# Patient Record
Sex: MALE | Race: OTHER | URBAN - METROPOLITAN AREA
[De-identification: names, ages, dates, MRNs, and addresses within clinical notes are randomized per-mention and may not be internally consistent; named-entity substitution may affect disease eponyms.]

---

## 2017-10-17 ENCOUNTER — APPOINTMENT (RX ONLY)
Dept: URBAN - METROPOLITAN AREA CLINIC 23 | Facility: CLINIC | Age: 82
Setting detail: DERMATOLOGY
End: 2017-10-17

## 2017-10-17 ENCOUNTER — RX ONLY (OUTPATIENT)
Age: 82
Setting detail: RX ONLY
End: 2017-10-17

## 2017-10-17 DIAGNOSIS — L81.4 OTHER MELANIN HYPERPIGMENTATION: ICD-10-CM

## 2017-10-17 DIAGNOSIS — Z85.828 PERSONAL HISTORY OF OTHER MALIGNANT NEOPLASM OF SKIN: ICD-10-CM

## 2017-10-17 DIAGNOSIS — L21.8 OTHER SEBORRHEIC DERMATITIS: ICD-10-CM

## 2017-10-17 DIAGNOSIS — L82.1 OTHER SEBORRHEIC KERATOSIS: ICD-10-CM

## 2017-10-17 PROCEDURE — ? COUNSELING

## 2017-10-17 PROCEDURE — 99213 OFFICE O/P EST LOW 20 MIN: CPT

## 2017-10-17 PROCEDURE — ? PRESCRIPTION

## 2017-10-17 RX ORDER — KETOCONAZOLE 20 MG/G
CREAM TOPICAL
Qty: 1 | Refills: 11 | Status: ERX

## 2017-10-17 RX ORDER — DESONIDE 0.5 MG/G
CREAM TOPICAL
Qty: 1 | Refills: 6 | Status: ERX

## 2017-10-17 RX ORDER — HYDROCORTISONE 25 MG/G
CREAM TOPICAL
Qty: 1 | Refills: 6 | Status: ERX | COMMUNITY
Start: 2017-10-17

## 2017-10-17 ASSESSMENT — LOCATION DETAILED DESCRIPTION DERM
LOCATION DETAILED: NASAL DORSUM
LOCATION DETAILED: RIGHT POSTERIOR SHOULDER
LOCATION DETAILED: LEFT MEDIAL SUPERIOR CHEST
LOCATION DETAILED: POSTERIOR MID-PARIETAL SCALP
LOCATION DETAILED: RIGHT SUPERIOR MEDIAL LOWER BACK
LOCATION DETAILED: GLABELLA
LOCATION DETAILED: LEFT POSTERIOR SHOULDER
LOCATION DETAILED: LEFT CENTRAL POSTAURICULAR SKIN
LOCATION DETAILED: RIGHT MEDIAL UPPER BACK

## 2017-10-17 ASSESSMENT — LOCATION SIMPLE DESCRIPTION DERM
LOCATION SIMPLE: LEFT SHOULDER
LOCATION SIMPLE: RIGHT UPPER BACK
LOCATION SIMPLE: RIGHT SHOULDER
LOCATION SIMPLE: SCALP
LOCATION SIMPLE: CHEST
LOCATION SIMPLE: POSTERIOR SCALP
LOCATION SIMPLE: NOSE
LOCATION SIMPLE: GLABELLA
LOCATION SIMPLE: RIGHT LOWER BACK

## 2017-10-17 ASSESSMENT — LOCATION ZONE DERM
LOCATION ZONE: ARM
LOCATION ZONE: FACE
LOCATION ZONE: TRUNK
LOCATION ZONE: NOSE
LOCATION ZONE: TRUNK
LOCATION ZONE: SCALP

## 2018-04-17 ENCOUNTER — APPOINTMENT (RX ONLY)
Dept: URBAN - METROPOLITAN AREA CLINIC 23 | Facility: CLINIC | Age: 83
Setting detail: DERMATOLOGY
End: 2018-04-17

## 2018-04-17 DIAGNOSIS — L82.1 OTHER SEBORRHEIC KERATOSIS: ICD-10-CM

## 2018-04-17 DIAGNOSIS — Z85.828 PERSONAL HISTORY OF OTHER MALIGNANT NEOPLASM OF SKIN: ICD-10-CM

## 2018-04-17 PROBLEM — L57.0 ACTINIC KERATOSIS: Status: ACTIVE | Noted: 2018-04-17

## 2018-04-17 PROBLEM — L29.8 OTHER PRURITUS: Status: ACTIVE | Noted: 2018-04-17

## 2018-04-17 PROCEDURE — 99213 OFFICE O/P EST LOW 20 MIN: CPT

## 2018-04-17 PROCEDURE — ? COUNSELING

## 2018-04-17 ASSESSMENT — LOCATION SIMPLE DESCRIPTION DERM
LOCATION SIMPLE: POSTERIOR SCALP
LOCATION SIMPLE: LEFT SCALP
LOCATION SIMPLE: LEFT FOREHEAD

## 2018-04-17 ASSESSMENT — LOCATION DETAILED DESCRIPTION DERM
LOCATION DETAILED: LEFT MEDIAL FRONTAL SCALP
LOCATION DETAILED: POSTERIOR MID-PARIETAL SCALP
LOCATION DETAILED: LEFT FOREHEAD

## 2018-04-17 ASSESSMENT — LOCATION ZONE DERM
LOCATION ZONE: SCALP
LOCATION ZONE: FACE

## 2018-12-13 ENCOUNTER — APPOINTMENT (RX ONLY)
Dept: URBAN - METROPOLITAN AREA CLINIC 23 | Facility: CLINIC | Age: 83
Setting detail: DERMATOLOGY
End: 2018-12-13

## 2018-12-13 ENCOUNTER — RX ONLY (OUTPATIENT)
Age: 83
Setting detail: RX ONLY
End: 2018-12-13

## 2018-12-13 DIAGNOSIS — L82.1 OTHER SEBORRHEIC KERATOSIS: ICD-10-CM

## 2018-12-13 DIAGNOSIS — L85.3 XEROSIS CUTIS: ICD-10-CM

## 2018-12-13 DIAGNOSIS — D485 NEOPLASM OF UNCERTAIN BEHAVIOR OF SKIN: ICD-10-CM

## 2018-12-13 DIAGNOSIS — L30.9 DERMATITIS, UNSPECIFIED: ICD-10-CM

## 2018-12-13 DIAGNOSIS — Z85.828 PERSONAL HISTORY OF OTHER MALIGNANT NEOPLASM OF SKIN: ICD-10-CM

## 2018-12-13 DIAGNOSIS — L57.0 ACTINIC KERATOSIS: ICD-10-CM

## 2018-12-13 PROBLEM — D48.5 NEOPLASM OF UNCERTAIN BEHAVIOR OF SKIN: Status: ACTIVE | Noted: 2018-12-13

## 2018-12-13 PROBLEM — L29.8 OTHER PRURITUS: Status: ACTIVE | Noted: 2018-12-13

## 2018-12-13 PROCEDURE — 99213 OFFICE O/P EST LOW 20 MIN: CPT | Mod: 25

## 2018-12-13 PROCEDURE — ? BIOPSY BY SHAVE METHOD

## 2018-12-13 PROCEDURE — ? LIQUID NITROGEN (COSMETIC)

## 2018-12-13 PROCEDURE — ? COUNSELING

## 2018-12-13 PROCEDURE — ? PRESCRIPTION

## 2018-12-13 PROCEDURE — 11100: CPT

## 2018-12-13 PROCEDURE — ? TREATMENT REGIMEN

## 2018-12-13 RX ORDER — FLUOROURACIL 2 G/40G
CREAM TOPICAL
Qty: 1 | Refills: 2 | Status: ERX

## 2018-12-13 RX ORDER — FLUOROURACIL 2 G/40G
CREAM TOPICAL
Qty: 1 | Refills: 2 | Status: ERX | COMMUNITY
Start: 2018-12-13

## 2018-12-13 RX ADMIN — FLUOROURACIL: 2 CREAM TOPICAL at 14:21

## 2018-12-13 ASSESSMENT — LOCATION DETAILED DESCRIPTION DERM
LOCATION DETAILED: RIGHT BUTTOCK
LOCATION DETAILED: NASAL SUPRATIP
LOCATION DETAILED: RIGHT ANTERIOR PROXIMAL THIGH
LOCATION DETAILED: LEFT SUPERIOR MEDIAL FOREHEAD
LOCATION DETAILED: POSTERIOR MID-PARIETAL SCALP
LOCATION DETAILED: LEFT FOREHEAD
LOCATION DETAILED: MID-FRONTAL SCALP

## 2018-12-13 ASSESSMENT — LOCATION SIMPLE DESCRIPTION DERM
LOCATION SIMPLE: POSTERIOR SCALP
LOCATION SIMPLE: RIGHT BUTTOCK
LOCATION SIMPLE: ANTERIOR SCALP
LOCATION SIMPLE: NOSE
LOCATION SIMPLE: RIGHT THIGH
LOCATION SIMPLE: LEFT FOREHEAD

## 2018-12-13 ASSESSMENT — LOCATION ZONE DERM
LOCATION ZONE: LEG
LOCATION ZONE: SCALP
LOCATION ZONE: NOSE
LOCATION ZONE: FACE
LOCATION ZONE: TRUNK

## 2018-12-13 NOTE — PROCEDURE: BIOPSY BY SHAVE METHOD
Anesthesia Type: 1% lidocaine with epinephrine
Type Of Destruction Used: Curettage
Additional Anesthesia Volume In Cc (Will Not Render If 0): 0
Biopsy Method: Dermablade
Depth Of Biopsy: dermis
Dressing: bandage
Electrodesiccation And Curettage Text: The wound bed was treated with electrodesiccation and curettage after the biopsy was performed.
Bill For Surgical Tray: no
Electrodesiccation Text: The wound bed was treated with electrodesiccation after the biopsy was performed.
Anesthesia Volume In Cc: 0.3
Hemostasis: Electrocautery
Wound Care: Petrolatum
Consent: Written consent was obtained and risks were reviewed including but not limited to scarring, infection, bleeding, scabbing, incomplete removal, nerve damage and allergy to anesthesia.
Silver Nitrate Text: The wound bed was treated with silver nitrate after the biopsy was performed.
Size Of Lesion In Cm: 0.4
Billing Type: Third-Party Bill
Notification Instructions: Patient will be notified of biopsy results. However, patient instructed to call the office if not contacted within 2 weeks.
Biopsy Type: H and E
Post-Care Instructions: I reviewed with the patient in detail post-care instructions. Patient is to keep the biopsy site dry overnight, and then apply bacitracin twice daily until healed. Patient may apply hydrogen peroxide soaks to remove any crusting.
Was A Bandage Applied: Yes
Path Notes (To The Dermatopathologist): Please check margins.
Detail Level: Detailed
Cryotherapy Text: The wound bed was treated with cryotherapy after the biopsy was performed.

## 2019-02-18 ENCOUNTER — APPOINTMENT (RX ONLY)
Dept: URBAN - METROPOLITAN AREA CLINIC 24 | Facility: CLINIC | Age: 84
Setting detail: DERMATOLOGY
End: 2019-02-18

## 2019-02-18 ENCOUNTER — RX ONLY (OUTPATIENT)
Age: 84
Setting detail: RX ONLY
End: 2019-02-18

## 2019-02-18 PROBLEM — C44.311 BASAL CELL CARCINOMA OF SKIN OF NOSE: Status: ACTIVE | Noted: 2019-02-18

## 2019-02-18 PROCEDURE — ? MOHS SURGERY

## 2019-02-18 PROCEDURE — 17311 MOHS 1 STAGE H/N/HF/G: CPT

## 2019-02-18 PROCEDURE — 17312 MOHS ADDL STAGE: CPT

## 2019-02-18 RX ORDER — HYDROCORTISONE 25 MG/G
CREAM TOPICAL
Qty: 1 | Refills: 6 | Status: ERX

## 2019-02-18 NOTE — PROCEDURE: MOHS SURGERY
Mohs Case Number: 
Show Eye Protection Variable: Yes
Consent (Near Eyelid Margin)/Introductory Paragraph: The rationale for Mohs was explained to the patient and consent was obtained. The risks, benefits and alternatives to therapy were discussed in detail. Specifically, the risks of ectropion or eyelid deformity, infection, scarring, bleeding, prolonged wound healing, incomplete removal, allergy to anesthesia, nerve injury and recurrence were addressed. Prior to the procedure, the treatment site was clearly identified and confirmed by the patient. All components of Universal Protocol/PAUSE Rule completed.
Tumor Debulked?: curette
Stage 2: Additional Anesthesia Volume In Cc: 0
Z Plasty Text: The lesion was extirpated to the level of the fat with a #15 scalpel blade.  Given the location of the defect, shape of the defect and the proximity to free margins a Z-plasty was deemed most appropriate for repair.  Using a sterile surgical marker, the appropriate transposition arms of the Z-plasty were drawn incorporating the defect and placing the expected incisions within the relaxed skin tension lines where possible.    The area thus outlined was incised deep to adipose tissue with a #15 scalpel blade.  The skin margins were undermined to an appropriate distance in all directions utilizing iris scissors.  The opposing transposition arms were then transposed into place in opposite direction and anchored with interrupted buried subcutaneous sutures.
Surgeon/Pathologist Verbiage (Will Incorporate Name Of Surgeon From Intro If Not Blank): operated in two distinct and integrated capacities as the surgeon and pathologist.
Quadrant Reporting?: no
Closure 2 Information: This tab is for additional flaps and grafts, including complex repair and grafts and complex repair and flaps. You can also specify a different location for the additional defect, if the location is the same you do not need to select a new one. We will insert the automated text for the repair you select below just as we do for solitary flaps and grafts. Please note that at this time if you select a location with a different insurance zone you will need to override the ICD10 and CPT if appropriate.
Referred To Oculoplastics For Closure Text (Leave Blank If You Do Not Want): After obtaining clear surgical margins the patient was sent to oculoplastics for surgical repair.  The patient understands they will receive post-surgical care and follow-up from the referring physician's office.
Mid-Level Procedure Text (C): After obtaining clear surgical margins the patient was sent to a mid-level provider for surgical repair.  The patient understands they will receive post-surgical care and follow-up from the mid-level provider.
Purse String (Intermediate) Text: Given the location of the defect and the characteristics of the surrounding skin a pursestring intermediate closure was deemed most appropriate.  Undermining was performed circumfirentially around the surgical defect.  A purstring suture was then placed and tightened.
Complex Repair And Graft Additional Text (Will Appearing After The Standard Complex Repair Text): The complex repair was not sufficient to completely close the primary defect. The remaining additional defect was repaired with the graft mentioned below.
Eye Protection Verbiage: Before proceeding with the stage, a plastic scleral shield was inserted. The globe was anesthetized with a few drops of 1% lidocaine with 1:100,000 epinephrine. Then, an appropriate sized scleral shield was chosen and coated with lacrilube ointment. The shield was gently inserted and left in place for the duration of each stage. After the stage was completed, the shield was gently removed.
Stage 4: Additional Anesthesia Type: 1% lidocaine with epinephrine
Bcc Histology Text: There were numerous aggregates of basaloid cells.
Estimated Blood Loss (Cc): minimal
Bcc Infiltrative Histology Text: There were numerous aggregates of basaloid cells demonstrating an infiltrative pattern.
Intermediate Repair Preamble Text (Leave Blank If You Do Not Want): Undermining was performed with blunt dissection.
Provider Procedure Text (E): After obtaining clear surgical margins the defect was repaired by another provider.
Hemostasis: Electrocautery
Plastic Surgeon Procedure Text (B): After obtaining clear surgical margins the patient was sent to plastics for surgical repair.  The patient understands they will receive post-surgical care and follow-up from the referring physician's office.
Mohs Rapid Report Verbiage: The area of clinically evident tumor was marked with skin marking ink and appropriately hatched.  The initial incision was made following the Mohs approach through the skin.  The specimen was taken to the lab, divided into the necessary number of pieces, chromacoded and processed according to the Mohs protocol.  This was repeated in successive stages until a tumor free defect was achieved.
Otolaryngologist Procedure Text (E): After obtaining clear surgical margins the patient was sent to otolaryngology for surgical repair.  The patient understands they will receive post-surgical care and follow-up from the referring physician's office.
Double M-Plasty Complex Repair Preamble Text (Leave Blank If You Do Not Want): Extensive wide undermining was performed.
Referred To Asc For Closure Text (Leave Blank If You Do Not Want): After obtaining clear surgical margins the patient was sent to an ASC for surgical repair.  The patient understands they will receive post-surgical care and follow-up from the ASC physician.
Modified Advancement Flap Text: The defect edges were debeveled with a #15 scalpel blade.  Given the location of the defect, shape of the defect and the proximity to free margins a modified advancement flap was deemed most appropriate.  Using a sterile surgical marker, an appropriate advancement flap was drawn incorporating the defect and placing the expected incisions within the relaxed skin tension lines where possible.    The area thus outlined was incised deep to adipose tissue with a #15 scalpel blade.  The skin margins were undermined to an appropriate distance in all directions utilizing iris scissors.
Composite Graft Text: The defect edges were debeveled with a #15 scalpel blade.  Given the location of the defect, shape of the defect, the proximity to free margins and the fact the defect was full thickness a composite graft was deemed most appropriate.  The defect was outline and then transferred to the donor site.  A full thickness graft was then excised from the donor site. The graft was then placed in the primary defect, oriented appropriately and then sutured into place.  The secondary defect was then repaired using a primary closure.
Dressing: dry sterile dressing
Secondary Intention Text (Leave Blank If You Do Not Want): The defect will heal with secondary intention.
Area L Indication Text: Tumors in this location are included in Area L (trunk and extremities).  Mohs surgery is indicated for larger tumors, 2 cm or larger, in these anatomic locations.
Home Suture Removal Text: Patient was provided instructions on removing sutures and will remove their sutures at home.  If they have any questions or difficulties they will call the office.
Medical Necessity Statement: Based on my medical judgement, Mohs surgery is the most appropriate treatment for this cancer compared to other treatments.
Alternatives Discussed Intro (Do Not Add Period): I discussed alternative treatments to Mohs surgery and specifically discussed the risks and benefits of
Epidermal Closure: running and interrupted
Ftsg Text: The defect edges were debeveled with a #15 scalpel blade.  Given the location of the defect, shape of the defect and the proximity to free margins a full thickness skin graft was deemed most appropriate.  Using a sterile surgical marker, the primary defect shape was transferred to the donor site. The area thus outlined was incised deep to adipose tissue with a #15 scalpel blade.  The harvested graft was then trimmed of adipose tissue until only dermis and epidermis was left.  The skin margins of the secondary defect were undermined to an appropriate distance in all directions utilizing iris scissors.  The secondary defect was closed with interrupted buried subcutaneous sutures.  The skin edges were then re-apposed with running  sutures.  The skin graft was then placed in the primary defect and oriented appropriately.
Transposition Flap Text: The defect edges were debeveled with a #15 scalpel blade.  Given the location of the defect and the proximity to free margins a transposition flap was deemed most appropriate.  Using a sterile surgical marker, an appropriate transposition flap was drawn incorporating the defect.    The area thus outlined was incised deep to adipose tissue with a #15 scalpel blade.  The skin margins were undermined to an appropriate distance in all directions utilizing iris scissors.
Helical Rim Advancement Flap Text: The defect edges were debeveled with a #15 blade scalpel.  Given the location of the defect and the proximity to free margins (helical rim) a double helical rim advancement flap was deemed most appropriate.  Using a sterile surgical marker, the appropriate advancement flaps were drawn incorporating the defect and placing the expected incisions between the helical rim and antihelix where possible.  The area thus outlined was incised through and through with a #15 scalpel blade.  With a skin hook and iris scissors, the flaps were gently and sharply undermined and freed up.
Consent 3/Introductory Paragraph: I gave the patient a chance to ask questions they had about the procedure.  Following this I explained the Mohs procedure and consent was obtained. The risks, benefits and alternatives to therapy were discussed in detail. Specifically, the risks of infection, scarring, bleeding, prolonged wound healing, incomplete removal, allergy to anesthesia, nerve injury and recurrence were addressed. Prior to the procedure, the treatment site was clearly identified and confirmed by the patient. All components of Universal Protocol/PAUSE Rule completed.
Anesthesia Volume In Cc: 4
Area H Indication Text: Tumors in this location are included in Area H (eyelids, eyebrows, nose, lips, chin, ear, pre-auricular, post-auricular, temple, genitalia, hands, feet, ankles and areola).  Tissue conservation is critical in these anatomic locations.
V-Y Plasty Text: The defect edges were debeveled with a #15 scalpel blade.  Given the location of the defect, shape of the defect and the proximity to free margins an V-Y advancement flap was deemed most appropriate.  Using a sterile surgical marker, an appropriate advancement flap was drawn incorporating the defect and placing the expected incisions within the relaxed skin tension lines where possible.    The area thus outlined was incised deep to adipose tissue with a #15 scalpel blade.  The skin margins were undermined to an appropriate distance in all directions utilizing iris scissors.
Inflammation Suggestive Of Cancer Camouflage Histology Text: There was a dense lymphocytic infiltrate which prevented adequate histologic evaluation of adjacent structures.
Consent (Temporal Branch)/Introductory Paragraph: The rationale for Mohs was explained to the patient and consent was obtained. The risks, benefits and alternatives to therapy were discussed in detail. Specifically, the risks of damage to the temporal branch of the facial nerve, infection, scarring, bleeding, prolonged wound healing, incomplete removal, allergy to anesthesia, and recurrence were addressed. Prior to the procedure, the treatment site was clearly identified and confirmed by the patient. All components of Universal Protocol/PAUSE Rule completed.
Partial Purse String (Intermediate) Text: Given the location of the defect and the characteristics of the surrounding skin an intermediate purse string closure was deemed most appropriate.  Undermining was performed circumfirentially around the surgical defect.  A purse string suture was then placed and tightened. Wound tension only allowed a partial closure of the circular defect.
Consent (Lip)/Introductory Paragraph: The rationale for Mohs was explained to the patient and consent was obtained. The risks, benefits and alternatives to therapy were discussed in detail. Specifically, the risks of lip deformity, changes in the oral aperture, infection, scarring, bleeding, prolonged wound healing, incomplete removal, allergy to anesthesia, nerve injury and recurrence were addressed. Prior to the procedure, the treatment site was clearly identified and confirmed by the patient. All components of Universal Protocol/PAUSE Rule completed.
Area M Indication Text: Tumors in this location are included in Area M (cheek, forehead, scalp, neck, jawline and pretibial skin).  Mohs surgery is indicated for tumors 1 cm or larger in these anatomic locations.
Post-Care Instructions: I reviewed with the patient in detail post-care instructions. Patient is not to engage in any heavy lifting, exercise, or swimming for the next 14 days. Should the patient develop any fevers, chills, bleeding, severe pain patient will contact the office immediately.
Localized Dermabrasion With Wire Brush Text: The patient was draped in routine manner.  Localized dermabrasion using 3 x 17 mm wire brush was performed in routine manner to papillary dermis. This spot dermabrasion is being performed to complete skin cancer reconstruction. It also will eliminate the other sun damaged precancerous cells that are known to be part of the regional effect of a lifetime's worth of sun exposure. This localized dermabrasion is therapeutic and should not be considered cosmetic in any regard.
Information: Selecting Yes will display possible errors in your note based on the variables you have selected. This validation is only offered as a suggestion for you. PLEASE NOTE THAT THE VALIDATION TEXT WILL BE REMOVED WHEN YOU FINALIZE YOUR NOTE. IF YOU WANT TO FAX A PRELIMINARY NOTE YOU WILL NEED TO TOGGLE THIS TO 'NO' IF YOU DO NOT WANT IT IN YOUR FAXED NOTE.
Unna Boot Text: An Unna boot was placed to help immobilize the limb and facilitate more rapid healing.
Cartilage Graft Text: The defect edges were debeveled with a #15 scalpel blade.  Given the location of the defect, shape of the defect, the fact the defect involved a full thickness cartilage defect a cartilage graft was deemed most appropriate.  An appropriate donor site was identified, cleansed, and anesthetized. The cartilage graft was then harvested and transferred to the recipient site, oriented appropriately and then sutured into place.  The secondary defect was then repaired using a primary closure.
Island Pedicle Flap-Requiring Vessel Identification Text: The defect edges were debeveled with a #15 scalpel blade.  Given the location of the defect, shape of the defect and the proximity to free margins an island pedicle advancement flap was deemed most appropriate.  Using a sterile surgical marker, an appropriate advancement flap was drawn, based on the axial vessel mentioned above, incorporating the defect, outlining the appropriate donor tissue and placing the expected incisions within the relaxed skin tension lines where possible.    The area thus outlined was incised deep to adipose tissue with a #15 scalpel blade.  The skin margins were undermined to an appropriate distance in all directions around the primary defect and laterally outward around the island pedicle utilizing iris scissors.  There was minimal undermining beneath the pedicle flap.
Closure 4 Information: This tab is for additional flaps and grafts above and beyond our usual structured repairs.  Please note if you enter information here it will not currently bill and you will need to add the billing information manually.
Purse String (Simple) Text: Given the location of the defect and the characteristics of the surrounding skin a pursestring closure was deemed most appropriate.  Undermining was performed circumfirentially around the surgical defect.  A purstring suture was then placed and tightened.
Advancement Flap (Single) Text: The defect edges were debeveled with a #15 scalpel blade.  Given the location of the defect and the proximity to free margins a single advancement flap was deemed most appropriate.  Using a sterile surgical marker, an appropriate advancement flap was drawn incorporating the defect and placing the expected incisions within the relaxed skin tension lines where possible.    The area thus outlined was incised deep to adipose tissue with a #15 scalpel blade.  The skin margins were undermined to an appropriate distance in all directions utilizing iris scissors.
Cheek-To-Nose Interpolation Flap Text: A decision was made to reconstruct the defect utilizing an interpolation axial flap and a staged reconstruction.  A telfa template was made of the defect.  This telfa template was then used to outline the Cheek-To-Nose Interpolation flap.  The donor area for the pedicle flap was then injected with anesthesia.  The flap was excised through the skin and subcutaneous tissue down to the layer of the underlying musculature.  The interpolation flap was carefully excised within this deep plane to maintain its blood supply.  The edges of the donor site were undermined.   The donor site was closed in a primary fashion.  The pedicle was then rotated into position and sutured.  Once the tube was sutured into place, adequate blood supply was confirmed with blanching and refill.  The pedicle was then wrapped with xeroform gauze and dressed appropriately with a telfa and gauze bandage to ensure continued blood supply and protect the attached pedicle.
Split-Thickness Skin Graft Text: The defect edges were debeveled with a #15 scalpel blade.  Given the location of the defect, shape of the defect and the proximity to free margins a split thickness skin graft was deemed most appropriate.  Using a sterile surgical marker, the primary defect shape was transferred to the donor site. The split thickness graft was then harvested.  The skin graft was then placed in the primary defect and oriented appropriately.
W Plasty Text: The lesion was extirpated to the level of the fat with a #15 scalpel blade.  Given the location of the defect, shape of the defect and the proximity to free margins a W-plasty was deemed most appropriate for repair.  Using a sterile surgical marker, the appropriate transposition arms of the W-plasty were drawn incorporating the defect and placing the expected incisions within the relaxed skin tension lines where possible.    The area thus outlined was incised deep to adipose tissue with a #15 scalpel blade.  The skin margins were undermined to an appropriate distance in all directions utilizing iris scissors.  The opposing transposition arms were then transposed into place in opposite direction and anchored with interrupted buried subcutaneous sutures.
A-T Advancement Flap Text: The defect edges were debeveled with a #15 scalpel blade.  Given the location of the defect, shape of the defect and the proximity to free margins an A-T advancement flap was deemed most appropriate.  Using a sterile surgical marker, an appropriate advancement flap was drawn incorporating the defect and placing the expected incisions within the relaxed skin tension lines where possible.    The area thus outlined was incised deep to adipose tissue with a #15 scalpel blade.  The skin margins were undermined to an appropriate distance in all directions utilizing iris scissors.
Partial Purse String (Simple) Text: Given the location of the defect and the characteristics of the surrounding skin a simple purse string closure was deemed most appropriate.  Undermining was performed circumfirentially around the surgical defect.  A purse string suture was then placed and tightened. Wound tension only allowed a partial closure of the circular defect.
Subsequent Stages Histo Method Verbiage: Using a similar technique to that described above, a thin layer of tissue was removed from all areas where tumor was visible on the previous stage.  The tissue was again oriented, mapped, dyed, and processed as above.
Rhombic Flap Text: The defect edges were debeveled with a #15 scalpel blade.  Given the location of the defect and the proximity to free margins a rhombic flap was deemed most appropriate.  Using a sterile surgical marker, an appropriate for rhombic flap was drawn incorporating the defect.    The area thus outlined was incised deep to adipose tissue with a #15 scalpel blade.  The skin margins were undermined to an appropriate distance in all directions utilizing iris scissors.
Banner Transposition Flap Text: The defect edges were debeveled with a #15 scalpel blade.  Given the location of the defect and the proximity to free margins a Banner transposition flap was deemed most appropriate.  Using a sterile surgical marker, an appropriate flap drawn around the defect. The area thus outlined was incised deep to adipose tissue with a #15 scalpel blade.  The skin margins were undermined to an appropriate distance in all directions utilizing iris scissors.
Crescentic Advancement Flap Text: The defect edges were debeveled with a #15 scalpel blade.  Given the location of the defect and the proximity to free margins a crescentic advancement flap was deemed most appropriate.  Using a sterile surgical marker, the appropriate advancement flap was drawn incorporating the defect and placing the expected incisions within the relaxed skin tension lines where possible.    The area thus outlined was incised deep to adipose tissue with a #15 scalpel blade.  The skin margins were undermined to an appropriate distance in all directions utilizing iris scissors.
Island Pedicle Flap Text: The defect edges were debeveled with a #15 scalpel blade.  Given the location of the defect, shape of the defect and the proximity to free margins an island pedicle advancement flap was deemed most appropriate.  Using a sterile surgical marker, an appropriate advancement flap was drawn incorporating the defect, outlining the appropriate donor tissue and placing the expected incisions within the relaxed skin tension lines where possible.    The area thus outlined was incised deep to adipose tissue with a #15 scalpel blade.  The skin margins were undermined to an appropriate distance in all directions around the primary defect and laterally outward around the island pedicle utilizing iris scissors.  There was minimal undermining beneath the pedicle flap.
Manual Repair Warning Statement: We plan on removing the manually selected variable below in favor of our much easier automatic structured text blocks found in the previous tab. We decided to do this to help make the flow better and give you the full power of structured data. Manual selection is never going to be ideal in our platform and I would encourage you to avoid using manual selection from this point on, especially since I will be sunsetting this feature. It is important that you do one of two things with the customized text below. First, you can save all of the text in a word file so you can have it for future reference. Second, transfer the text to the appropriate area in the Library tab. Lastly, if there is a flap or graft type which we do not have you need to let us know right away so I can add it in before the variable is hidden. No need to panic, we plan to give you roughly 6 months to make the change.
Consent (Ear)/Introductory Paragraph: The rationale for Mohs was explained to the patient and consent was obtained. The risks, benefits and alternatives to therapy were discussed in detail. Specifically, the risks of ear deformity, infection, scarring, bleeding, prolonged wound healing, incomplete removal, allergy to anesthesia, nerve injury and recurrence were addressed. Prior to the procedure, the treatment site was clearly identified and confirmed by the patient. All components of Universal Protocol/PAUSE Rule completed.
Postop Diagnosis: same
Dorsal Nasal Flap Text: The defect edges were debeveled with a #15 scalpel blade.  Given the location of the defect and the proximity to free margins a dorsal nasal flap was deemed most appropriate.  Using a sterile surgical marker, an appropriate dorsal nasal flap was drawn around the defect.    The area thus outlined was incised deep to adipose tissue with a #15 scalpel blade.  The skin margins were undermined to an appropriate distance in all directions utilizing iris scissors.
O-L Flap Text: The defect edges were debeveled with a #15 scalpel blade.  Given the location of the defect, shape of the defect and the proximity to free margins an O-L flap was deemed most appropriate.  Using a sterile surgical marker, an appropriate advancement flap was drawn incorporating the defect and placing the expected incisions within the relaxed skin tension lines where possible.    The area thus outlined was incised deep to adipose tissue with a #15 scalpel blade.  The skin margins were undermined to an appropriate distance in all directions utilizing iris scissors.
V-Y Flap Text: The defect edges were debeveled with a #15 scalpel blade.  Given the location of the defect, shape of the defect and the proximity to free margins a V-Y flap was deemed most appropriate.  Using a sterile surgical marker, an appropriate advancement flap was drawn incorporating the defect and placing the expected incisions within the relaxed skin tension lines where possible.    The area thus outlined was incised deep to adipose tissue with a #15 scalpel blade.  The skin margins were undermined to an appropriate distance in all directions utilizing iris scissors.
Deep Sutures: 3-0 Vicryl
O-T Advancement Flap Text: The defect edges were debeveled with a #15 scalpel blade.  Given the location of the defect, shape of the defect and the proximity to free margins an O-T advancement flap was deemed most appropriate.  Using a sterile surgical marker, an appropriate advancement flap was drawn incorporating the defect and placing the expected incisions within the relaxed skin tension lines where possible.    The area thus outlined was incised deep to adipose tissue with a #15 scalpel blade.  The skin margins were undermined to an appropriate distance in all directions utilizing iris scissors.
Bi-Rhombic Flap Text: The defect edges were debeveled with a #15 scalpel blade.  Given the location of the defect and the proximity to free margins a bi-rhombic flap was deemed most appropriate.  Using a sterile surgical marker, an appropriate rhombic flap was drawn incorporating the defect. The area thus outlined was incised deep to adipose tissue with a #15 scalpel blade.  The skin margins were undermined to an appropriate distance in all directions utilizing iris scissors.
Tissue Cultured Epidermal Autograft Text: The defect edges were debeveled with a #15 scalpel blade.  Given the location of the defect, shape of the defect and the proximity to free margins a tissue cultured epidermal autograft was deemed most appropriate.  The graft was then trimmed to fit the size of the defect.  The graft was then placed in the primary defect and oriented appropriately.
Number Of Stages: 3
Muscle Hinge Flap Text: The defect edges were debeveled with a #15 scalpel blade.  Given the size, depth and location of the defect and the proximity to free margins a muscle hinge flap was deemed most appropriate.  Using a sterile surgical marker, an appropriate hinge flap was drawn incorporating the defect. The area thus outlined was incised with a #15 scalpel blade.  The skin margins were undermined to an appropriate distance in all directions utilizing iris scissors.
Repair Type: No repair - secondary intention
Island Pedicle Flap With Canthal Suspension Text: The defect edges were debeveled with a #15 scalpel blade.  Given the location of the defect, shape of the defect and the proximity to free margins an island pedicle advancement flap was deemed most appropriate.  Using a sterile surgical marker, an appropriate advancement flap was drawn incorporating the defect, outlining the appropriate donor tissue and placing the expected incisions within the relaxed skin tension lines where possible. The area thus outlined was incised deep to adipose tissue with a #15 scalpel blade.  The skin margins were undermined to an appropriate distance in all directions around the primary defect and laterally outward around the island pedicle utilizing iris scissors.  There was minimal undermining beneath the pedicle flap. A suspension suture was placed in the canthal tendon to prevent tension and prevent ectropion.
Xenograft Text: The defect edges were debeveled with a #15 scalpel blade.  Given the location of the defect, shape of the defect and the proximity to free margins a xenograft was deemed most appropriate.  The graft was then trimmed to fit the size of the defect.  The graft was then placed in the primary defect and oriented appropriately.
Consent 2/Introductory Paragraph: Mohs surgery was explained to the patient and consent was obtained. The risks, benefits and alternatives to therapy were discussed in detail. Specifically, the risks of infection, scarring, bleeding, prolonged wound healing, incomplete removal, allergy to anesthesia, nerve injury and recurrence were addressed. Prior to the procedure, the treatment site was clearly identified and confirmed by the patient. All components of Universal Protocol/PAUSE Rule completed.
Cheiloplasty (Less Than 50%) Text: A decision was made to reconstruct the defect with a  cheiloplasty.  The defect was undermined extensively.  Additional obicularis oris muscle was excised with a 15 blade scalpel.  The defect was converted into a full thickness wedge, of less than 50% of the vertical height of the lip, to facilite a better cosmetic result.  Small vessels were then tied off with 5-0 monocyrl. The obicularis oris, superficial fascia, adipose and dermis were then reapproximated.  After the deeper layers were approximated the epidermis was reapproximated with particular care given to realign the vermillion border.
Posterior Auricular Interpolation Flap Text: A decision was made to reconstruct the defect utilizing an interpolation axial flap and a staged reconstruction.  A telfa template was made of the defect.  This telfa template was then used to outline the posterior auricular interpolation flap.  The donor area for the pedicle flap was then injected with anesthesia.  The flap was excised through the skin and subcutaneous tissue down to the layer of the underlying musculature.  The pedicle flap was carefully excised within this deep plane to maintain its blood supply.  The edges of the donor site were undermined.   The donor site was closed in a primary fashion.  The pedicle was then rotated into position and sutured.  Once the tube was sutured into place, adequate blood supply was confirmed with blanching and refill.  The pedicle was then wrapped with xeroform gauze and dressed appropriately with a telfa and gauze bandage to ensure continued blood supply and protect the attached pedicle.
Double O-Z Plasty Text: The defect edges were debeveled with a #15 scalpel blade.  Given the location of the defect, shape of the defect and the proximity to free margins a Double O-Z plasty (double transposition flap) was deemed most appropriate.  Using a sterile surgical marker, the appropriate transposition flaps were drawn incorporating the defect and placing the expected incisions within the relaxed skin tension lines where possible. The area thus outlined was incised deep to adipose tissue with a #15 scalpel blade.  The skin margins were undermined to an appropriate distance in all directions utilizing iris scissors.  Hemostasis was achieved with electrocautery.  The flaps were then transposed into place, one clockwise and the other counterclockwise, and anchored with interrupted buried subcutaneous sutures.
Rhomboid Transposition Flap Text: The defect edges were debeveled with a #15 scalpel blade.  Given the location of the defect and the proximity to free margins a rhomboid transposition flap was deemed most appropriate.  Using a sterile surgical marker, an appropriate rhomboid flap was drawn incorporating the defect.    The area thus outlined was incised deep to adipose tissue with a #15 scalpel blade.  The skin margins were undermined to an appropriate distance in all directions utilizing iris scissors.
Ear Wedge Repair Text: A wedge excision was completed by carrying down an excision through the full thickness of the ear and cartilage with an inward facing Burow's triangle. The wound was then closed in a layered fashion.
Anesthesia Volume In Cc: 2
Previous Accession (Optional): [id number]
Wound Care: Petrolatum
Consent (Marginal Mandibular)/Introductory Paragraph: The rationale for Mohs was explained to the patient and consent was obtained. The risks, benefits and alternatives to therapy were discussed in detail. Specifically, the risks of damage to the marginal mandibular branch of the facial nerve, infection, scarring, bleeding, prolonged wound healing, incomplete removal, allergy to anesthesia, and recurrence were addressed. Prior to the procedure, the treatment site was clearly identified and confirmed by the patient. All components of Universal Protocol/PAUSE Rule completed.
Bilateral Helical Rim Advancement Flap Text: The defect edges were debeveled with a #15 blade scalpel.  Given the location of the defect and the proximity to free margins (helical rim) a bilateral helical rim advancement flap was deemed most appropriate.  Using a sterile surgical marker, the appropriate advancement flaps were drawn incorporating the defect and placing the expected incisions between the helical rim and antihelix where possible.  The area thus outlined was incised through and through with a #15 scalpel blade.  With a skin hook and iris scissors, the flaps were gently and sharply undermined and freed up.
Advancement Flap (Double) Text: The defect edges were debeveled with a #15 scalpel blade.  Given the location of the defect and the proximity to free margins a double advancement flap was deemed most appropriate.  Using a sterile surgical marker, the appropriate advancement flaps were drawn incorporating the defect and placing the expected incisions within the relaxed skin tension lines where possible.    The area thus outlined was incised deep to adipose tissue with a #15 scalpel blade.  The skin margins were undermined to an appropriate distance in all directions utilizing iris scissors.
Mauc Instructions: By selecting yes to the question below the MAUC number will be added into the note.  This will be calculated automatically based on the diagnosis chosen, the size entered, the body zone selected (H,M,L) and the specific indications you chose. You will also have the option to override the Mohs AUC if you disagree with the automatically calculated number and this option is found in the Case Summary tab.
Epidermal Autograft Text: The defect edges were debeveled with a #15 scalpel blade.  Given the location of the defect, shape of the defect and the proximity to free margins an epidermal autograft was deemed most appropriate.  Using a sterile surgical marker, the primary defect shape was transferred to the donor site. The epidermal graft was then harvested.  The skin graft was then placed in the primary defect and oriented appropriately.
Ear Star Wedge Flap Text: The defect edges were debeveled with a #15 blade scalpel.  Given the location of the defect and the proximity to free margins (helical rim) an ear star wedge flap was deemed most appropriate.  Using a sterile surgical marker, the appropriate flap was drawn incorporating the defect and placing the expected incisions between the helical rim and antihelix where possible.  The area thus outlined was incised through and through with a #15 scalpel blade.
Mucosal Advancement Flap Text: Given the location of the defect, shape of the defect and the proximity to free margins a mucosal advancement flap was deemed most appropriate. Incisions were made with a 15 blade scalpel in the appropriate fashion along the cutaneous vermillion border and the mucosal lip. The remaining actinically damaged mucosal tissue was excised.  The mucosal advancement flap was then elevated to the gingival sulcus with care taken to preserve the neurovascular structures and advanced into the primary defect. Care was taken to ensure that precise realignment of the vermillion border was achieved.
Melolabial Interpolation Flap Text: A decision was made to reconstruct the defect utilizing an interpolation axial flap and a staged reconstruction.  A telfa template was made of the defect.  This telfa template was then used to outline the melolabial interpolation flap.  The donor area for the pedicle flap was then injected with anesthesia.  The flap was excised through the skin and subcutaneous tissue down to the layer of the underlying musculature.  The pedicle flap was carefully excised within this deep plane to maintain its blood supply.  The edges of the donor site were undermined.   The donor site was closed in a primary fashion.  The pedicle was then rotated into position and sutured.  Once the tube was sutured into place, adequate blood supply was confirmed with blanching and refill.  The pedicle was then wrapped with xeroform gauze and dressed appropriately with a telfa and gauze bandage to ensure continued blood supply and protect the attached pedicle.
Primary Defect Width In Cm (Final Defect Size - Required For Flaps/Grafts): 1.3
Burow's Advancement Flap Text: The defect edges were debeveled with a #15 scalpel blade.  Given the location of the defect and the proximity to free margins a Burow's advancement flap was deemed most appropriate.  Using a sterile surgical marker, the appropriate advancement flap was drawn incorporating the defect and placing the expected incisions within the relaxed skin tension lines where possible.    The area thus outlined was incised deep to adipose tissue with a #15 scalpel blade.  The skin margins were undermined to an appropriate distance in all directions utilizing iris scissors.
Initial Size Of Lesion: 0.7
Graft Donor Site Epidermal Sutures (Optional): 5-0 Prolene
No Residual Tumor Seen Histology Text: There were no malignant cells seen in the sections examined.
Location Indication Override (Is Already Calculated Based On Selected Body Location): Area H
Interpolation Flap Text: A decision was made to reconstruct the defect utilizing an interpolation axial flap and a staged reconstruction.  A telfa template was made of the defect.  This telfa template was then used to outline the interpolation flap.  The donor area for the pedicle flap was then injected with anesthesia.  The flap was excised through the skin and subcutaneous tissue down to the layer of the underlying musculature.  The interpolation flap was carefully excised within this deep plane to maintain its blood supply.  The edges of the donor site were undermined.   The donor site was closed in a primary fashion.  The pedicle was then rotated into position and sutured.  Once the tube was sutured into place, adequate blood supply was confirmed with blanching and refill.  The pedicle was then wrapped with xeroform gauze and dressed appropriately with a telfa and gauze bandage to ensure continued blood supply and protect the attached pedicle.
Graft Donor Site Dermal Sutures (Optional): 5-0 Vicryl
Mastoid Interpolation Flap Text: A decision was made to reconstruct the defect utilizing an interpolation axial flap and a staged reconstruction.  A telfa template was made of the defect.  This telfa template was then used to outline the mastoid interpolation flap.  The donor area for the pedicle flap was then injected with anesthesia.  The flap was excised through the skin and subcutaneous tissue down to the layer of the underlying musculature.  The pedicle flap was carefully excised within this deep plane to maintain its blood supply.  The edges of the donor site were undermined.   The donor site was closed in a primary fashion.  The pedicle was then rotated into position and sutured.  Once the tube was sutured into place, adequate blood supply was confirmed with blanching and refill.  The pedicle was then wrapped with xeroform gauze and dressed appropriately with a telfa and gauze bandage to ensure continued blood supply and protect the attached pedicle.
Star Wedge Flap Text: The defect edges were debeveled with a #15 scalpel blade.  Given the location of the defect, shape of the defect and the proximity to free margins a star wedge flap was deemed most appropriate.  Using a sterile surgical marker, an appropriate rotation flap was drawn incorporating the defect and placing the expected incisions within the relaxed skin tension lines where possible. The area thus outlined was incised deep to adipose tissue with a #15 scalpel blade.  The skin margins were undermined to an appropriate distance in all directions utilizing iris scissors.
No Repair - Repaired With Adjacent Surgical Defect Text (Leave Blank If You Do Not Want): After obtaining clear surgical margins the defect was repaired concurrently with another surgical defect which was in close approximation.
Consent 1/Introductory Paragraph: The rationale for Mohs was explained to the patient and consent was obtained. The risks, benefits and alternatives to therapy were discussed in detail. Specifically, the risks of infection, scarring, bleeding, prolonged wound healing, incomplete removal, allergy to anesthesia, nerve injury (both sensory which can be permanent and motor which is permanent) and recurrence were addressed. Prior to the procedure, the treatment site was clearly identified and confirmed by the patient.
Complex Repair And Flap Additional Text (Will Appearing After The Standard Complex Repair Text): The complex repair was not sufficient to completely close the primary defect. The remaining additional defect was repaired with the flap mentioned below.
Spiral Flap Text: The defect edges were debeveled with a #15 scalpel blade.  Given the location of the defect, shape of the defect and the proximity to free margins a spiral flap was deemed most appropriate.  Using a sterile surgical marker, an appropriate rotation flap was drawn incorporating the defect and placing the expected incisions within the relaxed skin tension lines where possible. The area thus outlined was incised deep to adipose tissue with a #15 scalpel blade.  The skin margins were undermined to an appropriate distance in all directions utilizing iris scissors.
Detail Level: Detailed
Bilobed Flap Text: The defect edges were debeveled with a #15 scalpel blade.  Given the location of the defect and the proximity to free margins a bilobe flap was deemed most appropriate.  Using a sterile surgical marker, an appropriate bilobe flap drawn around the defect.    The area thus outlined was incised deep to adipose tissue with a #15 scalpel blade.  The skin margins were undermined to an appropriate distance in all directions utilizing iris scissors.
Rotation Flap Text: The defect edges were debeveled with a #15 scalpel blade.  Given the location of the defect, shape of the defect and the proximity to free margins a rotation flap was deemed most appropriate.  Using a sterile surgical marker, an appropriate rotation flap was drawn incorporating the defect and placing the expected incisions within the relaxed skin tension lines where possible.    The area thus outlined was incised deep to adipose tissue with a #15 scalpel blade.  The skin margins were undermined to an appropriate distance in all directions utilizing iris scissors.
H Plasty Text: Given the location of the defect, shape of the defect and the proximity to free margins a H-plasty was deemed most appropriate for repair.  Using a sterile surgical marker, the appropriate advancement arms of the H-plasty were drawn incorporating the defect and placing the expected incisions within the relaxed skin tension lines where possible. The area thus outlined was incised deep to adipose tissue with a #15 scalpel blade. The skin margins were undermined to an appropriate distance in all directions utilizing iris scissors.  The opposing advancement arms were then advanced into place in opposite direction and anchored with interrupted buried subcutaneous sutures.
Consent (Scalp)/Introductory Paragraph: The rationale for Mohs was explained to the patient and consent was obtained. The risks, benefits and alternatives to therapy were discussed in detail. Specifically, the risks of changes in hair growth pattern secondary to repair, infection, scarring, bleeding, prolonged wound healing, incomplete removal, allergy to anesthesia, nerve injury and recurrence were addressed. Prior to the procedure, the treatment site was clearly identified and confirmed by the patient. All components of Universal Protocol/PAUSE Rule completed.
Mercedes Flap Text: The defect edges were debeveled with a #15 scalpel blade.  Given the location of the defect, shape of the defect and the proximity to free margins a Mercedes flap was deemed most appropriate.  Using a sterile surgical marker, an appropriate advancement flap was drawn incorporating the defect and placing the expected incisions within the relaxed skin tension lines where possible. The area thus outlined was incised deep to adipose tissue with a #15 scalpel blade.  The skin margins were undermined to an appropriate distance in all directions utilizing iris scissors.
Double Island Pedicle Flap Text: The defect edges were debeveled with a #15 scalpel blade.  Given the location of the defect, shape of the defect and the proximity to free margins a double island pedicle advancement flap was deemed most appropriate.  Using a sterile surgical marker, an appropriate advancement flap was drawn incorporating the defect, outlining the appropriate donor tissue and placing the expected incisions within the relaxed skin tension lines where possible.    The area thus outlined was incised deep to adipose tissue with a #15 scalpel blade.  The skin margins were undermined to an appropriate distance in all directions around the primary defect and laterally outward around the island pedicle utilizing iris scissors.  There was minimal undermining beneath the pedicle flap.
Epidermal Sutures: 4-0 Prolene
Surgeon: Cayetano Elliott MD
Skin Substitute Text: The defect edges were debeveled with a #15 scalpel blade.  Given the location of the defect, shape of the defect and the proximity to free margins a skin substitute graft was deemed most appropriate.  The graft material was trimmed to fit the size of the defect. The graft was then placed in the primary defect and oriented appropriately.
Dermal Autograft Text: The defect edges were debeveled with a #15 scalpel blade.  Given the location of the defect, shape of the defect and the proximity to free margins a dermal autograft was deemed most appropriate.  Using a sterile surgical marker, the primary defect shape was transferred to the donor site. The area thus outlined was incised deep to adipose tissue with a #15 scalpel blade.  The harvested graft was then trimmed of adipose and epidermal tissue until only dermis was left.  The skin graft was then placed in the primary defect and oriented appropriately.
Advancement-Rotation Flap Text: The defect edges were debeveled with a #15 scalpel blade.  Given the location of the defect, shape of the defect and the proximity to free margins an advancement-rotation flap was deemed most appropriate.  Using a sterile surgical marker, an appropriate flap was drawn incorporating the defect and placing the expected incisions within the relaxed skin tension lines where possible. The area thus outlined was incised deep to adipose tissue with a #15 scalpel blade.  The skin margins were undermined to an appropriate distance in all directions utilizing iris scissors.
Full Thickness Lip Wedge Repair (Flap) Text: Given the location of the defect and the proximity to free margins a full thickness wedge repair was deemed most appropriate.  Using a sterile surgical marker, the appropriate repair was drawn incorporating the defect and placing the expected incisions perpendicular to the vermillion border.  The vermillion border was also meticulously outlined to ensure appropriate reapproximation during the repair.  The area thus outlined was incised through and through with a #15 scalpel blade.  The muscularis and dermis were reaproximated with deep sutures following hemostasis. Care was taken to realign the vermillion border before proceeding with the superficial closure.  Once the vermillion was realigned the superfical and mucosal closure was finished.
Alar Island Pedicle Flap Text: The defect edges were debeveled with a #15 scalpel blade.  Given the location of the defect, shape of the defect and the proximity to the alar rim an island pedicle advancement flap was deemed most appropriate.  Using a sterile surgical marker, an appropriate advancement flap was drawn incorporating the defect, outlining the appropriate donor tissue and placing the expected incisions within the nasal ala running parallel to the alar rim. The area thus outlined was incised with a #15 scalpel blade.  The skin margins were undermined minimally to an appropriate distance in all directions around the primary defect and laterally outward around the island pedicle utilizing iris scissors.  There was minimal undermining beneath the pedicle flap.
Tarsorrhaphy Text: A tarsorrhaphy was performed using Frost sutures.
Graft Cartilage Fenestration Text: The cartilage was fenestrated with a 2mm punch biopsy to help facilitate graft survival and healing.
Consent (Nose)/Introductory Paragraph: The rationale for Mohs was explained to the patient and consent was obtained. The risks, benefits and alternatives to therapy were discussed in detail. Specifically, the risks of nasal deformity, changes in the flow of air through the nose, infection, scarring, bleeding, prolonged wound healing, incomplete removal, allergy to anesthesia, nerve injury and recurrence were addressed. Prior to the procedure, the treatment site was clearly identified and confirmed by the patient. All components of Universal Protocol/PAUSE Rule completed.
Cheiloplasty (Complex) Text: A decision was made to reconstruct the defect with a  cheiloplasty.  The defect was undermined extensively.  Additional obicularis oris muscle was excised with a 15 blade scalpel.  The defect was converted into a full thickness wedge to facilite a better cosmetic result.  Small vessels were then tied off with 5-0 monocyrl. The obicularis oris, superficial fascia, adipose and dermis were then reapproximated.  After the deeper layers were approximated the epidermis was reapproximated with particular care given to realign the vermillion border.
Consent Type: Consent 1 (Standard)
Cheek Interpolation Flap Text: A decision was made to reconstruct the defect utilizing an interpolation axial flap and a staged reconstruction.  A telfa template was made of the defect.  This telfa template was then used to outline the Cheek Interpolation flap.  The donor area for the pedicle flap was then injected with anesthesia.  The flap was excised through the skin and subcutaneous tissue down to the layer of the underlying musculature.  The interpolation flap was carefully excised within this deep plane to maintain its blood supply.  The edges of the donor site were undermined.   The donor site was closed in a primary fashion.  The pedicle was then rotated into position and sutured.  Once the tube was sutured into place, adequate blood supply was confirmed with blanching and refill.  The pedicle was then wrapped with xeroform gauze and dressed appropriately with a telfa and gauze bandage to ensure continued blood supply and protect the attached pedicle.
Bilobed Transposition Flap Text: The defect edges were debeveled with a #15 scalpel blade.  Given the location of the defect and the proximity to free margins a bilobed transposition flap was deemed most appropriate.  Using a sterile surgical marker, an appropriate bilobe flap drawn around the defect.    The area thus outlined was incised deep to adipose tissue with a #15 scalpel blade.  The skin margins were undermined to an appropriate distance in all directions utilizing iris scissors.
Hatchet Flap Text: The defect edges were debeveled with a #15 scalpel blade.  Given the location of the defect, shape of the defect and the proximity to free margins a hatchet flap was deemed most appropriate.  Using a sterile surgical marker, an appropriate hatchet flap was drawn incorporating the defect and placing the expected incisions within the relaxed skin tension lines where possible.    The area thus outlined was incised deep to adipose tissue with a #15 scalpel blade.  The skin margins were undermined to an appropriate distance in all directions utilizing iris scissors.
Trilobed Flap Text: The defect edges were debeveled with a #15 scalpel blade.  Given the location of the defect and the proximity to free margins a trilobed flap was deemed most appropriate.  Using a sterile surgical marker, an appropriate trilobed flap drawn around the defect.    The area thus outlined was incised deep to adipose tissue with a #15 scalpel blade.  The skin margins were undermined to an appropriate distance in all directions utilizing iris scissors.
Mohs Histo Method Verbiage: The section(s) were then chromacoded and processed in the Mohs lab using the Mohs protocol and submitted for frozen section.
Consent (Spinal Accessory)/Introductory Paragraph: The rationale for Mohs was explained to the patient and consent was obtained. The risks, benefits and alternatives to therapy were discussed in detail. Specifically, the risks of damage to the spinal accessory nerve, infection, scarring, bleeding, prolonged wound healing, incomplete removal, allergy to anesthesia, and recurrence were addressed. Prior to the procedure, the treatment site was clearly identified and confirmed by the patient. All components of Universal Protocol/PAUSE Rule completed.
Keystone Flap Text: The defect edges were debeveled with a #15 scalpel blade.  Given the location of the defect, shape of the defect a keystone flap was deemed most appropriate.  Using a sterile surgical marker, an appropriate keystone flap was drawn incorporating the defect, outlining the appropriate donor tissue and placing the expected incisions within the relaxed skin tension lines where possible. The area thus outlined was incised deep to adipose tissue with a #15 scalpel blade.  The skin margins were undermined to an appropriate distance in all directions around the primary defect and laterally outward around the flap utilizing iris scissors.
Melolabial Transposition Flap Text: The defect edges were debeveled with a #15 scalpel blade.  Given the location of the defect and the proximity to free margins a melolabial flap was deemed most appropriate.  Using a sterile surgical marker, an appropriate melolabial transposition flap was drawn incorporating the defect.    The area thus outlined was incised deep to adipose tissue with a #15 scalpel blade.  The skin margins were undermined to an appropriate distance in all directions utilizing iris scissors.
O-T Plasty Text: The defect edges were debeveled with a #15 scalpel blade.  Given the location of the defect, shape of the defect and the proximity to free margins an O-T plasty was deemed most appropriate.  Using a sterile surgical marker, an appropriate O-T plasty was drawn incorporating the defect and placing the expected incisions within the relaxed skin tension lines where possible.    The area thus outlined was incised deep to adipose tissue with a #15 scalpel blade.  The skin margins were undermined to an appropriate distance in all directions utilizing iris scissors.
Surgeon Performing Repair (Optional): Dr Elliott
Paramedian Forehead Flap Text: A decision was made to reconstruct the defect utilizing an interpolation axial flap and a staged reconstruction.  A telfa template was made of the defect.  This telfa template was then used to outline the paramedian forehead pedicle flap.  The donor area for the pedicle flap was then injected with anesthesia.  The flap was excised through the skin and subcutaneous tissue down to the layer of the underlying musculature.  The pedicle flap was carefully excised within this deep plane to maintain its blood supply.  The edges of the donor site were undermined.   The donor site was closed in a primary fashion.  The pedicle was then rotated into position and sutured.  Once the tube was sutured into place, adequate blood supply was confirmed with blanching and refill.  The pedicle was then wrapped with xeroform gauze and dressed appropriately with a telfa and gauze bandage to ensure continued blood supply and protect the attached pedicle.
Non-Graft Cartilage Fenestration Text: The cartilage was fenestrated with a 2mm punch biopsy to help facilitate healing.
O-Z Plasty Text: The defect edges were debeveled with a #15 scalpel blade.  Given the location of the defect, shape of the defect and the proximity to free margins an O-Z plasty (double transposition flap) was deemed most appropriate.  Using a sterile surgical marker, the appropriate transposition flaps were drawn incorporating the defect and placing the expected incisions within the relaxed skin tension lines where possible.    The area thus outlined was incised deep to adipose tissue with a #15 scalpel blade.  The skin margins were undermined to an appropriate distance in all directions utilizing iris scissors.  Hemostasis was achieved with electrocautery.  The flaps were then transposed into place, one clockwise and the other counterclockwise, and anchored with interrupted buried subcutaneous sutures.
S Plasty Text: Given the location and shape of the defect, and the orientation of relaxed skin tension lines, an S-plasty was deemed most appropriate for repair.  Using a sterile surgical marker, the appropriate outline of the S-plasty was drawn, incorporating the defect and placing the expected incisions within the relaxed skin tension lines where possible.  The area thus outlined was incised deep to adipose tissue with a #15 scalpel blade.  The skin margins were undermined to an appropriate distance in all directions utilizing iris scissors. The skin flaps were advanced over the defect.  The opposing margins were then approximated with interrupted buried subcutaneous sutures.
Donor Site Anesthesia Type: same as repair anesthesia
Mohs Method Verbiage: An incision at a 45 degree angle following the standard Mohs approach was done and the specimen was harvested as a microscopic controlled layer.
Same Histology In Subsequent Stages Text: The pattern and morphology of the tumor is as described in the first stage.
Repair Anesthesia Method: local infiltration

## 2019-03-04 ENCOUNTER — APPOINTMENT (RX ONLY)
Dept: URBAN - METROPOLITAN AREA CLINIC 23 | Facility: CLINIC | Age: 84
Setting detail: DERMATOLOGY
End: 2019-03-04

## 2019-03-04 DIAGNOSIS — Z48.01 ENCOUNTER FOR CHANGE OR REMOVAL OF SURGICAL WOUND DRESSING: ICD-10-CM

## 2019-03-04 PROBLEM — L57.0 ACTINIC KERATOSIS: Status: ACTIVE | Noted: 2019-03-04

## 2019-03-04 PROCEDURE — ? POST-OP WOUND CHECK

## 2019-03-04 PROCEDURE — 99024 POSTOP FOLLOW-UP VISIT: CPT

## 2019-03-04 ASSESSMENT — LOCATION ZONE DERM: LOCATION ZONE: NOSE

## 2019-03-04 ASSESSMENT — LOCATION DETAILED DESCRIPTION DERM: LOCATION DETAILED: NASAL TIP

## 2019-03-04 ASSESSMENT — LOCATION SIMPLE DESCRIPTION DERM: LOCATION SIMPLE: NOSE

## 2019-03-04 NOTE — PROCEDURE: POST-OP WOUND CHECK
Add 07406 Cpt? (Important Note: In 2017 The Use Of 77692 Is Being Tracked By Cms To Determine Future Global Period Reimbursement For Global Periods): no
Wound Evaluated By: Cayetano Elliott MD
Detail Level: Detailed

## 2019-03-18 ENCOUNTER — APPOINTMENT (RX ONLY)
Dept: URBAN - METROPOLITAN AREA CLINIC 24 | Facility: CLINIC | Age: 84
Setting detail: DERMATOLOGY
End: 2019-03-18

## 2019-03-18 DIAGNOSIS — Z48.01 ENCOUNTER FOR CHANGE OR REMOVAL OF SURGICAL WOUND DRESSING: ICD-10-CM

## 2019-03-18 PROBLEM — L85.3 XEROSIS CUTIS: Status: ACTIVE | Noted: 2019-03-18

## 2019-03-18 PROCEDURE — 99024 POSTOP FOLLOW-UP VISIT: CPT

## 2019-03-18 ASSESSMENT — LOCATION DETAILED DESCRIPTION DERM: LOCATION DETAILED: NASAL SUPRATIP

## 2019-03-18 ASSESSMENT — LOCATION SIMPLE DESCRIPTION DERM: LOCATION SIMPLE: NOSE

## 2019-03-18 ASSESSMENT — LOCATION ZONE DERM: LOCATION ZONE: NOSE

## 2019-04-08 ENCOUNTER — APPOINTMENT (RX ONLY)
Dept: URBAN - METROPOLITAN AREA CLINIC 24 | Facility: CLINIC | Age: 84
Setting detail: DERMATOLOGY
End: 2019-04-08

## 2019-04-08 DIAGNOSIS — Z48.01 ENCOUNTER FOR CHANGE OR REMOVAL OF SURGICAL WOUND DRESSING: ICD-10-CM

## 2019-04-08 DIAGNOSIS — L57.0 ACTINIC KERATOSIS: ICD-10-CM

## 2019-04-08 PROCEDURE — ? LIQUID NITROGEN

## 2019-04-08 PROCEDURE — ? POST-OP WOUND CHECK

## 2019-04-08 PROCEDURE — 17000 DESTRUCT PREMALG LESION: CPT

## 2019-04-08 ASSESSMENT — LOCATION SIMPLE DESCRIPTION DERM
LOCATION SIMPLE: NOSE
LOCATION SIMPLE: POSTERIOR SCALP

## 2019-04-08 ASSESSMENT — LOCATION ZONE DERM
LOCATION ZONE: SCALP
LOCATION ZONE: NOSE

## 2019-04-08 ASSESSMENT — LOCATION DETAILED DESCRIPTION DERM
LOCATION DETAILED: MID-OCCIPITAL SCALP
LOCATION DETAILED: NASAL DORSUM

## 2019-04-08 NOTE — PROCEDURE: POST-OP WOUND CHECK
Wound Evaluated By: Cayetano Elliott MD
Detail Level: Detailed
Add 87368 Cpt? (Important Note: In 2017 The Use Of 62742 Is Being Tracked By Cms To Determine Future Global Period Reimbursement For Global Periods): no

## 2019-12-19 ENCOUNTER — APPOINTMENT (RX ONLY)
Dept: URBAN - METROPOLITAN AREA CLINIC 23 | Facility: CLINIC | Age: 84
Setting detail: DERMATOLOGY
End: 2019-12-19

## 2019-12-19 DIAGNOSIS — L57.0 ACTINIC KERATOSIS: ICD-10-CM

## 2019-12-19 DIAGNOSIS — Z85.828 PERSONAL HISTORY OF OTHER MALIGNANT NEOPLASM OF SKIN: ICD-10-CM

## 2019-12-19 DIAGNOSIS — F42.4 EXCORIATION (SKIN-PICKING) DISORDER: ICD-10-CM

## 2019-12-19 DIAGNOSIS — L82.1 OTHER SEBORRHEIC KERATOSIS: ICD-10-CM

## 2019-12-19 PROBLEM — S80.922A UNSPECIFIED SUPERFICIAL INJURY OF LEFT LOWER LEG, INITIAL ENCOUNTER: Status: ACTIVE | Noted: 2019-12-19

## 2019-12-19 PROCEDURE — 17003 DESTRUCT PREMALG LES 2-14: CPT

## 2019-12-19 PROCEDURE — 99213 OFFICE O/P EST LOW 20 MIN: CPT | Mod: 25

## 2019-12-19 PROCEDURE — ? COUNSELING

## 2019-12-19 PROCEDURE — 17000 DESTRUCT PREMALG LESION: CPT

## 2019-12-19 PROCEDURE — ? LIQUID NITROGEN

## 2019-12-19 ASSESSMENT — LOCATION DETAILED DESCRIPTION DERM
LOCATION DETAILED: LEFT SUPERIOR FOREHEAD
LOCATION DETAILED: LEFT SUPERIOR MEDIAL FOREHEAD
LOCATION DETAILED: NASAL TIP
LOCATION DETAILED: NASAL DORSUM
LOCATION DETAILED: LEFT DISTAL PRETIBIAL REGION

## 2019-12-19 ASSESSMENT — LOCATION ZONE DERM
LOCATION ZONE: LEG
LOCATION ZONE: FACE
LOCATION ZONE: NOSE

## 2019-12-19 ASSESSMENT — LOCATION SIMPLE DESCRIPTION DERM
LOCATION SIMPLE: LEFT PRETIBIAL REGION
LOCATION SIMPLE: LEFT FOREHEAD
LOCATION SIMPLE: NOSE

## 2019-12-19 NOTE — PROCEDURE: LIQUID NITROGEN
Number Of Freeze-Thaw Cycles: 1 freeze-thaw cycle
Consent: The patient's consent was obtained including but not limited to risks of crusting, scabbing, blistering, scarring, darker or lighter pigmentary change, recurrence, incomplete removal and infection.
Detail Level: Detailed
Post-Care Instructions: I reviewed with the patient in detail post-care instructions. Patient is to wear sunprotection, and avoid picking at any of the treated lesions. Pt may apply Vaseline to crusted or scabbing areas.
Render Post-Care Instructions In Note?: no
Duration Of Freeze Thaw-Cycle (Seconds): 5

## 2020-06-25 ENCOUNTER — APPOINTMENT (RX ONLY)
Dept: URBAN - METROPOLITAN AREA CLINIC 24 | Facility: CLINIC | Age: 85
Setting detail: DERMATOLOGY
End: 2020-06-25

## 2020-06-25 DIAGNOSIS — Z85.828 PERSONAL HISTORY OF OTHER MALIGNANT NEOPLASM OF SKIN: ICD-10-CM

## 2020-06-25 DIAGNOSIS — L57.0 ACTINIC KERATOSIS: ICD-10-CM

## 2020-06-25 DIAGNOSIS — D485 NEOPLASM OF UNCERTAIN BEHAVIOR OF SKIN: ICD-10-CM

## 2020-06-25 DIAGNOSIS — L82.1 OTHER SEBORRHEIC KERATOSIS: ICD-10-CM

## 2020-06-25 PROBLEM — D48.5 NEOPLASM OF UNCERTAIN BEHAVIOR OF SKIN: Status: ACTIVE | Noted: 2020-06-25

## 2020-06-25 PROCEDURE — 11102 TANGNTL BX SKIN SINGLE LES: CPT | Mod: 59

## 2020-06-25 PROCEDURE — ? BIOPSY BY SHAVE METHOD

## 2020-06-25 PROCEDURE — 69100 BIOPSY OF EXTERNAL EAR: CPT | Mod: 59

## 2020-06-25 PROCEDURE — 99213 OFFICE O/P EST LOW 20 MIN: CPT | Mod: 25

## 2020-06-25 PROCEDURE — 17000 DESTRUCT PREMALG LESION: CPT | Mod: 59

## 2020-06-25 PROCEDURE — ? LIQUID NITROGEN

## 2020-06-25 ASSESSMENT — LOCATION DETAILED DESCRIPTION DERM
LOCATION DETAILED: LEFT DISTAL PRETIBIAL REGION
LOCATION DETAILED: NASAL SUPRATIP
LOCATION DETAILED: LEFT SUPERIOR MEDIAL FOREHEAD
LOCATION DETAILED: NASAL TIP
LOCATION DETAILED: LEFT DISTAL LATERAL CALF
LOCATION DETAILED: LEFT TRIANGULAR FOSSA
LOCATION DETAILED: RIGHT SUPERIOR PARIETAL SCALP

## 2020-06-25 ASSESSMENT — LOCATION ZONE DERM
LOCATION ZONE: NOSE
LOCATION ZONE: SCALP
LOCATION ZONE: LEG
LOCATION ZONE: EAR
LOCATION ZONE: FACE

## 2020-06-25 ASSESSMENT — LOCATION SIMPLE DESCRIPTION DERM
LOCATION SIMPLE: LEFT PRETIBIAL REGION
LOCATION SIMPLE: SCALP
LOCATION SIMPLE: LEFT FOREHEAD
LOCATION SIMPLE: LEFT EAR
LOCATION SIMPLE: LEFT CALF
LOCATION SIMPLE: NOSE

## 2020-06-25 NOTE — PROCEDURE: BIOPSY BY SHAVE METHOD
Render Post-Care Instructions In Note?: no
Wound Care: Petrolatum
Biopsy Type: H and E
Detail Level: Detailed
Size Of Lesion In Cm: 0
Consent: Written consent was obtained and risks were reviewed including but not limited to scarring, infection, bleeding, scabbing, incomplete removal, nerve damage and allergy to anesthesia.
Anesthesia Type: 1% lidocaine with epinephrine
Information: Selecting Yes will display possible errors in your note based on the variables you have selected. This validation is only offered as a suggestion for you. PLEASE NOTE THAT THE VALIDATION TEXT WILL BE REMOVED WHEN YOU FINALIZE YOUR NOTE. IF YOU WANT TO FAX A PRELIMINARY NOTE YOU WILL NEED TO TOGGLE THIS TO 'NO' IF YOU DO NOT WANT IT IN YOUR FAXED NOTE.
Silver Nitrate Text: The wound bed was treated with silver nitrate after the biopsy was performed.
Post-Care Instructions: I reviewed with the patient in detail post-care instructions. Patient is to keep the biopsy site dry overnight, and then apply bacitracin twice daily until healed. Patient may apply hydrogen peroxide soaks to remove any crusting.
Electrodesiccation And Curettage Text: The wound bed was treated with electrodesiccation and curettage after the biopsy was performed.
Accession #: [id number]
Depth Of Biopsy: dermis
Cryotherapy Text: The wound bed was treated with cryotherapy after the biopsy was performed.
Hemostasis: Electrocautery
Type Of Destruction Used: Curettage
Dressing: bandage
Billing Type: Third-Party Bill
Path Notes (To The Dermatopathologist): Please check margins.
Was A Bandage Applied: Yes
Anesthesia Volume In Cc: 0.8
Notification Instructions: Patient will be notified of biopsy results. However, patient instructed to call the office if not contacted within 2 weeks.
Electrodesiccation Text: The wound bed was treated with electrodesiccation after the biopsy was performed.
Biopsy Method: Dermablade

## 2020-07-29 ENCOUNTER — APPOINTMENT (RX ONLY)
Dept: URBAN - METROPOLITAN AREA CLINIC 24 | Facility: CLINIC | Age: 85
Setting detail: DERMATOLOGY
End: 2020-07-29

## 2020-07-29 PROBLEM — D04.4 CARCINOMA IN SITU OF SKIN OF SCALP AND NECK: Status: ACTIVE | Noted: 2020-07-29

## 2020-07-29 PROBLEM — L29.8 OTHER PRURITUS: Status: ACTIVE | Noted: 2020-07-29

## 2020-07-29 PROBLEM — D04.22 CARCINOMA IN SITU OF SKIN OF LEFT EAR AND EXTERNAL AURICULAR CANAL: Status: ACTIVE | Noted: 2020-07-29

## 2020-07-29 PROCEDURE — 17311 MOHS 1 STAGE H/N/HF/G: CPT | Mod: 76

## 2020-07-29 PROCEDURE — 17312 MOHS ADDL STAGE: CPT

## 2020-07-29 PROCEDURE — ? MOHS SURGERY

## 2020-07-29 PROCEDURE — 17311 MOHS 1 STAGE H/N/HF/G: CPT

## 2020-07-29 NOTE — PROCEDURE: MOHS SURGERY
Show Biopsy Photo Reviewed Variable: Yes
Stage 13: Additional Anesthesia Type: 1% lidocaine with epinephrine
Eye Protection Verbiage: Before proceeding with the stage, a plastic scleral shield was inserted. The globe was anesthetized with a few drops of 1% lidocaine with 1:100,000 epinephrine. Then, an appropriate sized scleral shield was chosen and coated with lacrilube ointment. The shield was gently inserted and left in place for the duration of each stage. After the stage was completed, the shield was gently removed.
Xenograft Text: The defect edges were debeveled with a #15 scalpel blade.  Given the location of the defect, shape of the defect and the proximity to free margins a xenograft was deemed most appropriate.  The graft was then trimmed to fit the size of the defect.  The graft was then placed in the primary defect and oriented appropriately.
Provider Procedure Text (F): After obtaining clear surgical margins the defect was repaired by another provider.
Second Skin Substitute Units (Will Override Primary Defect Units If Greater Than 0): 0
Special Stains Stage 3 - Results: Base On Clearance Noted Above
Add Option For Suturegard When Performing Closure?: No
Closure 4 Information: This tab is for additional flaps and grafts above and beyond our usual structured repairs.  Please note if you enter information here it will not currently bill and you will need to add the billing information manually.
Complex Repair And Graft Additional Text (Will Appearing After The Standard Complex Repair Text): The complex repair was not sufficient to completely close the primary defect. The remaining additional defect was repaired with the graft mentioned below.
Cartilage Graft Text: The defect edges were debeveled with a #15 scalpel blade.  Given the location of the defect, shape of the defect, the fact the defect involved a full thickness cartilage defect a cartilage graft was deemed most appropriate.  An appropriate donor site was identified, cleansed, and anesthetized. The cartilage graft was then harvested and transferred to the recipient site, oriented appropriately and then sutured into place.  The secondary defect was then repaired using a primary closure.
Helical Rim Text: The closure involved the helical rim.
Oculoplastic Surgeon Procedure Text (D): After obtaining clear surgical margins the patient was sent to oculoplastics for surgical repair.  The patient understands they will receive post-surgical care and follow-up from the referring physician's office.
Complex Repair And Flap Additional Text (Will Appearing After The Standard Complex Repair Text): The complex repair was not sufficient to completely close the primary defect. The remaining additional defect was repaired with the flap mentioned below.
Purse String (Simple) Text: Given the location of the defect and the characteristics of the surrounding skin a pursestring closure was deemed most appropriate.  Undermining was performed circumfirentially around the surgical defect.  A purstring suture was then placed and tightened.
Tumor Depth: Less than 6mm from granular layer and no invasion beyond the subcutaneous fat
Mucosal Advancement Flap Text: Given the location of the defect, shape of the defect and the proximity to free margins a mucosal advancement flap was deemed most appropriate. Incisions were made with a 15 blade scalpel in the appropriate fashion along the cutaneous vermillion border and the mucosal lip. The remaining actinically damaged mucosal tissue was excised.  The mucosal advancement flap was then elevated to the gingival sulcus with care taken to preserve the neurovascular structures and advanced into the primary defect. Care was taken to ensure that precise realignment of the vermillion border was achieved.
Composite Graft Text: The defect edges were debeveled with a #15 scalpel blade.  Given the location of the defect, shape of the defect, the proximity to free margins and the fact the defect was full thickness a composite graft was deemed most appropriate.  The defect was outline and then transferred to the donor site.  A full thickness graft was then excised from the donor site. The graft was then placed in the primary defect, oriented appropriately and then sutured into place.  The secondary defect was then repaired using a primary closure.
O-Z Flap Text: The defect edges were debeveled with a #15 scalpel blade.  Given the location of the defect, shape of the defect and the proximity to free margins an O-Z flap was deemed most appropriate.  Using a sterile surgical marker, an appropriate transposition flap was drawn incorporating the defect and placing the expected incisions within the relaxed skin tension lines where possible. The area thus outlined was incised deep to adipose tissue with a #15 scalpel blade.  The skin margins were undermined to an appropriate distance in all directions utilizing iris scissors.
Ear Wedge Repair Text: A wedge excision was completed by carrying down an excision through the full thickness of the ear and cartilage with an inward facing Burow's triangle. The wound was then closed in a layered fashion.
V-Y Plasty Text: The defect edges were debeveled with a #15 scalpel blade.  Given the location of the defect, shape of the defect and the proximity to free margins an V-Y advancement flap was deemed most appropriate.  Using a sterile surgical marker, an appropriate advancement flap was drawn incorporating the defect and placing the expected incisions within the relaxed skin tension lines where possible.    The area thus outlined was incised deep to adipose tissue with a #15 scalpel blade.  The skin margins were undermined to an appropriate distance in all directions utilizing iris scissors.
O-L Flap Text: The defect edges were debeveled with a #15 scalpel blade.  Given the location of the defect, shape of the defect and the proximity to free margins an O-L flap was deemed most appropriate.  Using a sterile surgical marker, an appropriate advancement flap was drawn incorporating the defect and placing the expected incisions within the relaxed skin tension lines where possible.    The area thus outlined was incised deep to adipose tissue with a #15 scalpel blade.  The skin margins were undermined to an appropriate distance in all directions utilizing iris scissors.
Cheiloplasty (Complex) Text: A decision was made to reconstruct the defect with a  cheiloplasty.  The defect was undermined extensively.  Additional obicularis oris muscle was excised with a 15 blade scalpel.  The defect was converted into a full thickness wedge to facilite a better cosmetic result.  Small vessels were then tied off with 5-0 monocyrl. The obicularis oris, superficial fascia, adipose and dermis were then reapproximated.  After the deeper layers were approximated the epidermis was reapproximated with particular care given to realign the vermillion border.
Advancement Flap (Double) Text: The defect edges were debeveled with a #15 scalpel blade.  Given the location of the defect and the proximity to free margins a double advancement flap was deemed most appropriate.  Using a sterile surgical marker, the appropriate advancement flaps were drawn incorporating the defect and placing the expected incisions within the relaxed skin tension lines where possible.    The area thus outlined was incised deep to adipose tissue with a #15 scalpel blade.  The skin margins were undermined to an appropriate distance in all directions utilizing iris scissors.
Interpolation Flap Text: A decision was made to reconstruct the defect utilizing an interpolation axial flap and a staged reconstruction.  A telfa template was made of the defect.  This telfa template was then used to outline the interpolation flap.  The donor area for the pedicle flap was then injected with anesthesia.  The flap was excised through the skin and subcutaneous tissue down to the layer of the underlying musculature.  The interpolation flap was carefully excised within this deep plane to maintain its blood supply.  The edges of the donor site were undermined.   The donor site was closed in a primary fashion.  The pedicle was then rotated into position and sutured.  Once the tube was sutured into place, adequate blood supply was confirmed with blanching and refill.  The pedicle was then wrapped with xeroform gauze and dressed appropriately with a telfa and gauze bandage to ensure continued blood supply and protect the attached pedicle.
Mid-Level Procedure Text (D): After obtaining clear surgical margins the patient was sent to a mid-level provider for surgical repair.  The patient understands they will receive post-surgical care and follow-up from the mid-level provider.
Muscle Hinge Flap Text: The defect edges were debeveled with a #15 scalpel blade.  Given the size, depth and location of the defect and the proximity to free margins a muscle hinge flap was deemed most appropriate.  Using a sterile surgical marker, an appropriate hinge flap was drawn incorporating the defect. The area thus outlined was incised with a #15 scalpel blade.  The skin margins were undermined to an appropriate distance in all directions utilizing iris scissors.
Peng Advancement Flap Text: The defect edges were debeveled with a #15 scalpel blade.  Given the location of the defect, shape of the defect and the proximity to free margins a Peng advancement flap was deemed most appropriate.  Using a sterile surgical marker, an appropriate advancement flap was drawn incorporating the defect and placing the expected incisions within the relaxed skin tension lines where possible. The area thus outlined was incised deep to adipose tissue with a #15 scalpel blade.  The skin margins were undermined to an appropriate distance in all directions utilizing iris scissors.
Otolaryngologist Procedure Text (E): After obtaining clear surgical margins the patient was sent to otolaryngology for surgical repair.  The patient understands they will receive post-surgical care and follow-up from the referring physician's office.
Mauc Instructions: By selecting yes to the question below the MAUC number will be added into the note.  This will be calculated automatically based on the diagnosis chosen, the size entered, the body zone selected (H,M,L) and the specific indications you chose. You will also have the option to override the Mohs AUC if you disagree with the automatically calculated number and this option is found in the Case Summary tab.
Retention Suture Bite Size: 3 mm
Ear Star Wedge Flap Text: The defect edges were debeveled with a #15 blade scalpel.  Given the location of the defect and the proximity to free margins (helical rim) an ear star wedge flap was deemed most appropriate.  Using a sterile surgical marker, the appropriate flap was drawn incorporating the defect and placing the expected incisions between the helical rim and antihelix where possible.  The area thus outlined was incised through and through with a #15 scalpel blade.
Melolabial Transposition Flap Text: The defect edges were debeveled with a #15 scalpel blade.  Given the location of the defect and the proximity to free margins a melolabial flap was deemed most appropriate.  Using a sterile surgical marker, an appropriate melolabial transposition flap was drawn incorporating the defect.    The area thus outlined was incised deep to adipose tissue with a #15 scalpel blade.  The skin margins were undermined to an appropriate distance in all directions utilizing iris scissors.
Crescentic Intermediate Repair Preamble Text (Leave Blank If You Do Not Want): Undermining was performed with blunt dissection.
Island Pedicle Flap With Canthal Suspension Text: The defect edges were debeveled with a #15 scalpel blade.  Given the location of the defect, shape of the defect and the proximity to free margins an island pedicle advancement flap was deemed most appropriate.  Using a sterile surgical marker, an appropriate advancement flap was drawn incorporating the defect, outlining the appropriate donor tissue and placing the expected incisions within the relaxed skin tension lines where possible. The area thus outlined was incised deep to adipose tissue with a #15 scalpel blade.  The skin margins were undermined to an appropriate distance in all directions around the primary defect and laterally outward around the island pedicle utilizing iris scissors.  There was minimal undermining beneath the pedicle flap. A suspension suture was placed in the canthal tendon to prevent tension and prevent ectropion.
Include Tumor Staging In Mohs Note?: Please Select the Appropriate Response
Consent (Lip)/Introductory Paragraph: The rationale for Mohs was explained to the patient and consent was obtained. The risks, benefits and alternatives to therapy were discussed in detail. Specifically, the risks of lip deformity, changes in the oral aperture, infection, scarring, bleeding, prolonged wound healing, incomplete removal, allergy to anesthesia, nerve injury and recurrence were addressed. Prior to the procedure, the treatment site was clearly identified and confirmed by the patient. All components of Universal Protocol/PAUSE Rule completed.
Double O-Z Plasty Text: The defect edges were debeveled with a #15 scalpel blade.  Given the location of the defect, shape of the defect and the proximity to free margins a Double O-Z plasty (double transposition flap) was deemed most appropriate.  Using a sterile surgical marker, the appropriate transposition flaps were drawn incorporating the defect and placing the expected incisions within the relaxed skin tension lines where possible. The area thus outlined was incised deep to adipose tissue with a #15 scalpel blade.  The skin margins were undermined to an appropriate distance in all directions utilizing iris scissors.  Hemostasis was achieved with electrocautery.  The flaps were then transposed into place, one clockwise and the other counterclockwise, and anchored with interrupted buried subcutaneous sutures.
Location Indication Override (Is Already Calculated Based On Selected Body Location): Area M
Alar Island Pedicle Flap Text: The defect edges were debeveled with a #15 scalpel blade.  Given the location of the defect, shape of the defect and the proximity to the alar rim an island pedicle advancement flap was deemed most appropriate.  Using a sterile surgical marker, an appropriate advancement flap was drawn incorporating the defect, outlining the appropriate donor tissue and placing the expected incisions within the nasal ala running parallel to the alar rim. The area thus outlined was incised with a #15 scalpel blade.  The skin margins were undermined minimally to an appropriate distance in all directions around the primary defect and laterally outward around the island pedicle utilizing iris scissors.  There was minimal undermining beneath the pedicle flap.
Advancement Flap (Single) Text: The defect edges were debeveled with a #15 scalpel blade.  Given the location of the defect and the proximity to free margins a single advancement flap was deemed most appropriate.  Using a sterile surgical marker, an appropriate advancement flap was drawn incorporating the defect and placing the expected incisions within the relaxed skin tension lines where possible.    The area thus outlined was incised deep to adipose tissue with a #15 scalpel blade.  The skin margins were undermined to an appropriate distance in all directions utilizing iris scissors.
Complex Repair Preamble Text (Leave Blank If You Do Not Want): Extensive wide undermining was performed.
Cheek-To-Nose Interpolation Flap Text: A decision was made to reconstruct the defect utilizing an interpolation axial flap and a staged reconstruction.  A telfa template was made of the defect.  This telfa template was then used to outline the Cheek-To-Nose Interpolation flap.  The donor area for the pedicle flap was then injected with anesthesia.  The flap was excised through the skin and subcutaneous tissue down to the layer of the underlying musculature.  The interpolation flap was carefully excised within this deep plane to maintain its blood supply.  The edges of the donor site were undermined.   The donor site was closed in a primary fashion.  The pedicle was then rotated into position and sutured.  Once the tube was sutured into place, adequate blood supply was confirmed with blanching and refill.  The pedicle was then wrapped with xeroform gauze and dressed appropriately with a telfa and gauze bandage to ensure continued blood supply and protect the attached pedicle.
Consent 3/Introductory Paragraph: I gave the patient a chance to ask questions they had about the procedure.  Following this I explained the Mohs procedure and consent was obtained. The risks, benefits and alternatives to therapy were discussed in detail. Specifically, the risks of infection, scarring, bleeding, prolonged wound healing, incomplete removal, allergy to anesthesia, nerve injury and recurrence were addressed. Prior to the procedure, the treatment site was clearly identified and confirmed by the patient. All components of Universal Protocol/PAUSE Rule completed.
Plastic Surgeon Procedure Text (E): After obtaining clear surgical margins the patient was sent to plastics for surgical repair.  The patient understands they will receive post-surgical care and follow-up from the referring physician's office.
Primary Defect Length In Cm (Final Defect Size - Required For Flaps/Grafts): 2.1
Banner Transposition Flap Text: The defect edges were debeveled with a #15 scalpel blade.  Given the location of the defect and the proximity to free margins a Banner transposition flap was deemed most appropriate.  Using a sterile surgical marker, an appropriate flap drawn around the defect. The area thus outlined was incised deep to adipose tissue with a #15 scalpel blade.  The skin margins were undermined to an appropriate distance in all directions utilizing iris scissors.
Inflammation Suggestive Of Cancer Camouflage Histology Text: There was a dense lymphocytic infiltrate which prevented adequate histologic evaluation of adjacent structures.
No Repair - Repaired With Adjacent Surgical Defect Text (Leave Blank If You Do Not Want): After obtaining clear surgical margins the defect was repaired concurrently with another surgical defect which was in close approximation.
Dressing: pressure dressing
Area L Indication Text: Tumors in this location are included in Area L (trunk and extremities).  Mohs surgery is indicated for larger tumors, 2 cm or larger, in these anatomic locations.
Bcc Histology Text: There were numerous aggregates of basaloid cells.
Dressing (No Sutures): dry sterile dressing
Postop Diagnosis: same
Asc Procedure Text (E): After obtaining clear surgical margins the patient was sent to an ASC for surgical repair.  The patient understands they will receive post-surgical care and follow-up from the ASC physician.
Surgeon: Cayetano Elliott MD
Tarsorrhaphy Text: A tarsorrhaphy was performed using Frost sutures.
Epidermal Closure: running
Epidermal Sutures: 5-0 Chromic Gut
Localized Dermabrasion With Wire Brush Text: The patient was draped in routine manner.  Localized dermabrasion using 3 x 17 mm wire brush was performed in routine manner to papillary dermis. This spot dermabrasion is being performed to complete skin cancer reconstruction. It also will eliminate the other sun damaged precancerous cells that are known to be part of the regional effect of a lifetime's worth of sun exposure. This localized dermabrasion is therapeutic and should not be considered cosmetic in any regard.
Tissue Cultured Epidermal Autograft Text: The defect edges were debeveled with a #15 scalpel blade.  Given the location of the defect, shape of the defect and the proximity to free margins a tissue cultured epidermal autograft was deemed most appropriate.  The graft was then trimmed to fit the size of the defect.  The graft was then placed in the primary defect and oriented appropriately.
Donor Site Anesthesia Type: same as repair anesthesia
Consent (Spinal Accessory)/Introductory Paragraph: The rationale for Mohs was explained to the patient and consent was obtained. The risks, benefits and alternatives to therapy were discussed in detail. Specifically, the risks of damage to the spinal accessory nerve, infection, scarring, bleeding, prolonged wound healing, incomplete removal, allergy to anesthesia, and recurrence were addressed. Prior to the procedure, the treatment site was clearly identified and confirmed by the patient. All components of Universal Protocol/PAUSE Rule completed.
Medical Necessity Statement: Based on my medical judgement, Mohs surgery is the most appropriate treatment for this cancer compared to other treatments.
Hemigard Postcare Instructions: The HEMIGARD strips are to remain completely dry for at least 5-7 days.
Estimated Blood Loss (Cc): minimal
Pain Refusal Text: I offered to prescribe pain medication but the patient refused to take this medication.
Mart-1 - Positive Histology Text: MART-1 staining demonstrates areas of higher density and clustering of melanocytes with Pagetoid spread upwards within the epidermis. The surgical margins are positive for tumor cells.
Deep Sutures: 5-0 Vicryl
Repair Hemostasis (Optional): Electrocautery
Zygomaticofacial Flap Text: Given the location of the defect, shape of the defect and the proximity to free margins a zygomaticofacial flap was deemed most appropriate for repair.  Using a sterile surgical marker, the appropriate flap was drawn incorporating the defect and placing the expected incisions within the relaxed skin tension lines where possible. The area thus outlined was incised deep to adipose tissue with a #15 scalpel blade with preservation of a vascular pedicle.  The skin margins were undermined to an appropriate distance in all directions utilizing iris scissors.  The flap was then placed into the defect and anchored with interrupted buried subcutaneous sutures.
O-Z Plasty Text: The defect edges were debeveled with a #15 scalpel blade.  Given the location of the defect, shape of the defect and the proximity to free margins an O-Z plasty (double transposition flap) was deemed most appropriate.  Using a sterile surgical marker, the appropriate transposition flaps were drawn incorporating the defect and placing the expected incisions within the relaxed skin tension lines where possible.    The area thus outlined was incised deep to adipose tissue with a #15 scalpel blade.  The skin margins were undermined to an appropriate distance in all directions utilizing iris scissors.  Hemostasis was achieved with electrocautery.  The flaps were then transposed into place, one clockwise and the other counterclockwise, and anchored with interrupted buried subcutaneous sutures.
Undermining Type: Entire Wound
Secondary Intention Text (Leave Blank If You Do Not Want): The defect will heal with secondary intention.
A-T Advancement Flap Text: The defect edges were debeveled with a #15 scalpel blade.  Given the location of the defect, shape of the defect and the proximity to free margins an A-T advancement flap was deemed most appropriate.  Using a sterile surgical marker, an appropriate advancement flap was drawn incorporating the defect and placing the expected incisions within the relaxed skin tension lines where possible.    The area thus outlined was incised deep to adipose tissue with a #15 scalpel blade.  The skin margins were undermined to an appropriate distance in all directions utilizing iris scissors.
No Residual Tumor Seen Histology Text: There were no malignant cells seen in the sections examined.
Graft Cartilage Fenestration Text: The cartilage was fenestrated with a 2mm punch biopsy to help facilitate graft survival and healing.
O-T Plasty Text: The defect edges were debeveled with a #15 scalpel blade.  Given the location of the defect, shape of the defect and the proximity to free margins an O-T plasty was deemed most appropriate.  Using a sterile surgical marker, an appropriate O-T plasty was drawn incorporating the defect and placing the expected incisions within the relaxed skin tension lines where possible.    The area thus outlined was incised deep to adipose tissue with a #15 scalpel blade.  The skin margins were undermined to an appropriate distance in all directions utilizing iris scissors.
Graft Donor Site Epidermal Sutures (Optional): 5-0 Prolene
Consent (Ear)/Introductory Paragraph: The rationale for Mohs was explained to the patient and consent was obtained. The risks, benefits and alternatives to therapy were discussed in detail. Specifically, the risks of ear deformity, infection, scarring, bleeding, prolonged wound healing, incomplete removal, allergy to anesthesia, nerve injury and recurrence were addressed. Prior to the procedure, the treatment site was clearly identified and confirmed by the patient. All components of Universal Protocol/PAUSE Rule completed.
Island Pedicle Flap Text: The defect edges were debeveled with a #15 scalpel blade.  Given the location of the defect, shape of the defect and the proximity to free margins an island pedicle advancement flap was deemed most appropriate.  Using a sterile surgical marker, an appropriate advancement flap was drawn incorporating the defect, outlining the appropriate donor tissue and placing the expected incisions within the relaxed skin tension lines where possible.    The area thus outlined was incised deep to adipose tissue with a #15 scalpel blade.  The skin margins were undermined to an appropriate distance in all directions around the primary defect and laterally outward around the island pedicle utilizing iris scissors.  There was minimal undermining beneath the pedicle flap.
O-T Advancement Flap Text: The defect edges were debeveled with a #15 scalpel blade.  Given the location of the defect, shape of the defect and the proximity to free margins an O-T advancement flap was deemed most appropriate.  Using a sterile surgical marker, an appropriate advancement flap was drawn incorporating the defect and placing the expected incisions within the relaxed skin tension lines where possible.    The area thus outlined was incised deep to adipose tissue with a #15 scalpel blade.  The skin margins were undermined to an appropriate distance in all directions utilizing iris scissors.
Cheiloplasty (Less Than 50%) Text: A decision was made to reconstruct the defect with a  cheiloplasty.  The defect was undermined extensively.  Additional obicularis oris muscle was excised with a 15 blade scalpel.  The defect was converted into a full thickness wedge, of less than 50% of the vertical height of the lip, to facilite a better cosmetic result.  Small vessels were then tied off with 5-0 monocyrl. The obicularis oris, superficial fascia, adipose and dermis were then reapproximated.  After the deeper layers were approximated the epidermis was reapproximated with particular care given to realign the vermillion border.
Home Suture Removal Text: Patient was provided instructions on removing sutures and will remove their sutures at home.  If they have any questions or difficulties they will call the office.
Star Wedge Flap Text: The defect edges were debeveled with a #15 scalpel blade.  Given the location of the defect, shape of the defect and the proximity to free margins a star wedge flap was deemed most appropriate.  Using a sterile surgical marker, an appropriate rotation flap was drawn incorporating the defect and placing the expected incisions within the relaxed skin tension lines where possible. The area thus outlined was incised deep to adipose tissue with a #15 scalpel blade.  The skin margins were undermined to an appropriate distance in all directions utilizing iris scissors.
Modified Advancement Flap Text: The defect edges were debeveled with a #15 scalpel blade.  Given the location of the defect, shape of the defect and the proximity to free margins a modified advancement flap was deemed most appropriate.  Using a sterile surgical marker, an appropriate advancement flap was drawn incorporating the defect and placing the expected incisions within the relaxed skin tension lines where possible.    The area thus outlined was incised deep to adipose tissue with a #15 scalpel blade.  The skin margins were undermined to an appropriate distance in all directions utilizing iris scissors.
Paramedian Forehead Flap Text: A decision was made to reconstruct the defect utilizing an interpolation axial flap and a staged reconstruction.  A telfa template was made of the defect.  This telfa template was then used to outline the paramedian forehead pedicle flap.  The donor area for the pedicle flap was then injected with anesthesia.  The flap was excised through the skin and subcutaneous tissue down to the layer of the underlying musculature.  The pedicle flap was carefully excised within this deep plane to maintain its blood supply.  The edges of the donor site were undermined.   The donor site was closed in a primary fashion.  The pedicle was then rotated into position and sutured.  Once the tube was sutured into place, adequate blood supply was confirmed with blanching and refill.  The pedicle was then wrapped with xeroform gauze and dressed appropriately with a telfa and gauze bandage to ensure continued blood supply and protect the attached pedicle.
Cheek Interpolation Flap Text: A decision was made to reconstruct the defect utilizing an interpolation axial flap and a staged reconstruction.  A telfa template was made of the defect.  This telfa template was then used to outline the Cheek Interpolation flap.  The donor area for the pedicle flap was then injected with anesthesia.  The flap was excised through the skin and subcutaneous tissue down to the layer of the underlying musculature.  The interpolation flap was carefully excised within this deep plane to maintain its blood supply.  The edges of the donor site were undermined.   The donor site was closed in a primary fashion.  The pedicle was then rotated into position and sutured.  Once the tube was sutured into place, adequate blood supply was confirmed with blanching and refill.  The pedicle was then wrapped with xeroform gauze and dressed appropriately with a telfa and gauze bandage to ensure continued blood supply and protect the attached pedicle.
Unna Boot Text: An Unna boot was placed to help immobilize the limb and facilitate more rapid healing.
Burow's Graft Text: The defect edges were debeveled with a #15 scalpel blade.  Given the location of the defect, shape of the defect, the proximity to free margins and the presence of a standing cone deformity a Burow's skin graft was deemed most appropriate. The standing cone was removed and this tissue was then trimmed to the shape of the primary defect. The adipose tissue was also removed until only dermis and epidermis were left.  The skin margins of the secondary defect were undermined to an appropriate distance in all directions utilizing iris scissors.  The secondary defect was closed with interrupted buried subcutaneous sutures.  The skin edges were then re-apposed with running  sutures.  The skin graft was then placed in the primary defect and oriented appropriately.
Mercedes Flap Text: The defect edges were debeveled with a #15 scalpel blade.  Given the location of the defect, shape of the defect and the proximity to free margins a Mercedes flap was deemed most appropriate.  Using a sterile surgical marker, an appropriate advancement flap was drawn incorporating the defect and placing the expected incisions within the relaxed skin tension lines where possible. The area thus outlined was incised deep to adipose tissue with a #15 scalpel blade.  The skin margins were undermined to an appropriate distance in all directions utilizing iris scissors.
Vermilion Border Text: The closure involved the vermilion border.
Primary Defect Length In Cm (Final Defect Size - Required For Flaps/Grafts): 2.9
Dorsal Nasal Flap Text: The defect edges were debeveled with a #15 scalpel blade.  Given the location of the defect and the proximity to free margins a dorsal nasal flap was deemed most appropriate.  Using a sterile surgical marker, an appropriate dorsal nasal flap was drawn around the defect.    The area thus outlined was incised deep to adipose tissue with a #15 scalpel blade.  The skin margins were undermined to an appropriate distance in all directions utilizing iris scissors.
Consent 1/Introductory Paragraph: The rationale for Mohs was explained to the patient and consent was obtained. The risks, benefits and alternatives to therapy were discussed in detail. Specifically, the risks of infection, scarring, bleeding, prolonged wound healing, incomplete removal, allergy to anesthesia, nerve injury (both sensory which can be permanent and motor which is permanent) and recurrence were addressed. Prior to the procedure, the treatment site was clearly identified and confirmed by the patient.
Bilateral Helical Rim Advancement Flap Text: The defect edges were debeveled with a #15 blade scalpel.  Given the location of the defect and the proximity to free margins (helical rim) a bilateral helical rim advancement flap was deemed most appropriate.  Using a sterile surgical marker, the appropriate advancement flaps were drawn incorporating the defect and placing the expected incisions between the helical rim and antihelix where possible.  The area thus outlined was incised through and through with a #15 scalpel blade.  With a skin hook and iris scissors, the flaps were gently and sharply undermined and freed up.
Same Histology In Subsequent Stages Text: The pattern and morphology of the tumor is as described in the first stage.
Subsequent Stages Histo Method Verbiage: Using a similar technique to that described above, a thin layer of tissue was removed from all areas where tumor was visible on the previous stage.  The tissue was again oriented, mapped, dyed, and processed as above.
Helical Rim Advancement Flap Text: The defect edges were debeveled with a #15 blade scalpel.  Given the location of the defect and the proximity to free margins (helical rim) a double helical rim advancement flap was deemed most appropriate.  Using a sterile surgical marker, the appropriate advancement flaps were drawn incorporating the defect and placing the expected incisions between the helical rim and antihelix where possible.  The area thus outlined was incised through and through with a #15 scalpel blade.  With a skin hook and iris scissors, the flaps were gently and sharply undermined and freed up.
Transposition Flap Text: The defect edges were debeveled with a #15 scalpel blade.  Given the location of the defect and the proximity to free margins a transposition flap was deemed most appropriate.  Using a sterile surgical marker, an appropriate transposition flap was drawn incorporating the defect.    The area thus outlined was incised deep to adipose tissue with a #15 scalpel blade.  The skin margins were undermined to an appropriate distance in all directions utilizing iris scissors.
Consent (Scalp)/Introductory Paragraph: The rationale for Mohs was explained to the patient and consent was obtained. The risks, benefits and alternatives to therapy were discussed in detail. Specifically, the risks of changes in hair growth pattern secondary to repair, infection, scarring, bleeding, prolonged wound healing, incomplete removal, allergy to anesthesia, nerve injury and recurrence were addressed. Prior to the procedure, the treatment site was clearly identified and confirmed by the patient. All components of Universal Protocol/PAUSE Rule completed.
Manual Repair Warning Statement: We plan on removing the manually selected variable below in favor of our much easier automatic structured text blocks found in the previous tab. We decided to do this to help make the flow better and give you the full power of structured data. Manual selection is never going to be ideal in our platform and I would encourage you to avoid using manual selection from this point on, especially since I will be sunsetting this feature. It is important that you do one of two things with the customized text below. First, you can save all of the text in a word file so you can have it for future reference. Second, transfer the text to the appropriate area in the Library tab. Lastly, if there is a flap or graft type which we do not have you need to let us know right away so I can add it in before the variable is hidden. No need to panic, we plan to give you roughly 6 months to make the change.
Hemigard Retention Suture: 2-0 Nylon
Hemigard Intro: Due to skin fragility and wound tension, it was decided to use HEMIGARD adhesive retention suture devices to permit a linear closure. The skin was cleaned and dried for a 6cm distance away from the wound. Excessive hair, if present, was removed to allow for adhesion.
Consent (Temporal Branch)/Introductory Paragraph: The rationale for Mohs was explained to the patient and consent was obtained. The risks, benefits and alternatives to therapy were discussed in detail. Specifically, the risks of damage to the temporal branch of the facial nerve, infection, scarring, bleeding, prolonged wound healing, incomplete removal, allergy to anesthesia, and recurrence were addressed. Prior to the procedure, the treatment site was clearly identified and confirmed by the patient. All components of Universal Protocol/PAUSE Rule completed.
Closure 2 Information: This tab is for additional flaps and grafts, including complex repair and grafts and complex repair and flaps. You can also specify a different location for the additional defect, if the location is the same you do not need to select a new one. We will insert the automated text for the repair you select below just as we do for solitary flaps and grafts. Please note that at this time if you select a location with a different insurance zone you will need to override the ICD10 and CPT if appropriate.
Area H Indication Text: Tumors in this location are included in Area H (eyelids, eyebrows, nose, lips, chin, ear, pre-auricular, post-auricular, temple, genitalia, hands, feet, ankles and areola).  Tissue conservation is critical in these anatomic locations.
Mohs Rapid Report Verbiage: The area of clinically evident tumor was marked with skin marking ink and appropriately hatched.  The initial incision was made following the Mohs approach through the skin.  The specimen was taken to the lab, divided into the necessary number of pieces, chromacoded and processed according to the Mohs protocol.  This was repeated in successive stages until a tumor free defect was achieved.
Mohs Histo Method Verbiage: The section(s) were then chromacoded and processed in the Mohs lab using the Mohs protocol and submitted for frozen section.
Partial Purse String (Simple) Text: Given the location of the defect and the characteristics of the surrounding skin a simple purse string closure was deemed most appropriate.  Undermining was performed circumfirentially around the surgical defect.  A purse string suture was then placed and tightened. Wound tension only allowed a partial closure of the circular defect.
Dermal Autograft Text: The defect edges were debeveled with a #15 scalpel blade.  Given the location of the defect, shape of the defect and the proximity to free margins a dermal autograft was deemed most appropriate.  Using a sterile surgical marker, the primary defect shape was transferred to the donor site. The area thus outlined was incised deep to adipose tissue with a #15 scalpel blade.  The harvested graft was then trimmed of adipose and epidermal tissue until only dermis was left.  The skin graft was then placed in the primary defect and oriented appropriately.
Surgeon/Pathologist Verbiage (Will Incorporate Name Of Surgeon From Intro If Not Blank): operated in two distinct and integrated capacities as the surgeon and pathologist.
Mart-1 - Negative Histology Text: MART-1 staining demonstrates a normal density and pattern of melanocytes along the dermal-epidermal junction. The surgical margins are negative for tumor cells.
Post-Care Instructions: I reviewed with the patient in detail post-care instructions. Patient is not to engage in any heavy lifting, exercise, or swimming for the next 14 days. Should the patient develop any fevers, chills, bleeding, severe pain patient will contact the office immediately.
Suturegard Body: The suture ends were repeatedly re-tightened and re-clamped to achieve the desired tissue expansion.
Partial Purse String (Intermediate) Text: Given the location of the defect and the characteristics of the surrounding skin an intermediate purse string closure was deemed most appropriate.  Undermining was performed circumfirentially around the surgical defect.  A purse string suture was then placed and tightened. Wound tension only allowed a partial closure of the circular defect.
Rotation Flap Text: The defect edges were debeveled with a #15 scalpel blade.  Given the location of the defect, shape of the defect and the proximity to free margins a rotation flap was deemed most appropriate.  Using a sterile surgical marker, an appropriate rotation flap was drawn incorporating the defect and placing the expected incisions within the relaxed skin tension lines where possible.    The area thus outlined was incised deep to adipose tissue with a #15 scalpel blade.  The skin margins were undermined to an appropriate distance in all directions utilizing iris scissors.
Skin Substitute Text: The defect edges were debeveled with a #15 scalpel blade.  Given the location of the defect, shape of the defect and the proximity to free margins a skin substitute graft was deemed most appropriate.  The graft material was trimmed to fit the size of the defect. The graft was then placed in the primary defect and oriented appropriately.
Advancement-Rotation Flap Text: The defect edges were debeveled with a #15 scalpel blade.  Given the location of the defect, shape of the defect and the proximity to free margins an advancement-rotation flap was deemed most appropriate.  Using a sterile surgical marker, an appropriate flap was drawn incorporating the defect and placing the expected incisions within the relaxed skin tension lines where possible. The area thus outlined was incised deep to adipose tissue with a #15 scalpel blade.  The skin margins were undermined to an appropriate distance in all directions utilizing iris scissors.
Split-Thickness Skin Graft Text: The defect edges were debeveled with a #15 scalpel blade.  Given the location of the defect, shape of the defect and the proximity to free margins a split thickness skin graft was deemed most appropriate.  Using a sterile surgical marker, the primary defect shape was transferred to the donor site. The split thickness graft was then harvested.  The skin graft was then placed in the primary defect and oriented appropriately.
V-Y Flap Text: The defect edges were debeveled with a #15 scalpel blade.  Given the location of the defect, shape of the defect and the proximity to free margins a V-Y flap was deemed most appropriate.  Using a sterile surgical marker, an appropriate advancement flap was drawn incorporating the defect and placing the expected incisions within the relaxed skin tension lines where possible.    The area thus outlined was incised deep to adipose tissue with a #15 scalpel blade.  The skin margins were undermined to an appropriate distance in all directions utilizing iris scissors.
Crescentic Advancement Flap Text: The defect edges were debeveled with a #15 scalpel blade.  Given the location of the defect and the proximity to free margins a crescentic advancement flap was deemed most appropriate.  Using a sterile surgical marker, the appropriate advancement flap was drawn incorporating the defect and placing the expected incisions within the relaxed skin tension lines where possible.    The area thus outlined was incised deep to adipose tissue with a #15 scalpel blade.  The skin margins were undermined to an appropriate distance in all directions utilizing iris scissors.
Ftsg Text: The defect edges were debeveled with a #15 scalpel blade.  Given the location of the defect, shape of the defect and the proximity to free margins a full thickness skin graft was deemed most appropriate.  Using a sterile surgical marker, the primary defect shape was transferred to the donor site. The area thus outlined was incised deep to adipose tissue with a #15 scalpel blade.  The harvested graft was then trimmed of adipose tissue until only dermis and epidermis was left.  The skin margins of the secondary defect were undermined to an appropriate distance in all directions utilizing iris scissors.  The secondary defect was closed with interrupted buried subcutaneous sutures.  The skin edges were then re-apposed with running  sutures.  The skin graft was then placed in the primary defect and oriented appropriately.
Posterior Auricular Interpolation Flap Text: A decision was made to reconstruct the defect utilizing an interpolation axial flap and a staged reconstruction.  A telfa template was made of the defect.  This telfa template was then used to outline the posterior auricular interpolation flap.  The donor area for the pedicle flap was then injected with anesthesia.  The flap was excised through the skin and subcutaneous tissue down to the layer of the underlying musculature.  The pedicle flap was carefully excised within this deep plane to maintain its blood supply.  The edges of the donor site were undermined.   The donor site was closed in a primary fashion.  The pedicle was then rotated into position and sutured.  Once the tube was sutured into place, adequate blood supply was confirmed with blanching and refill.  The pedicle was then wrapped with xeroform gauze and dressed appropriately with a telfa and gauze bandage to ensure continued blood supply and protect the attached pedicle.
Initial Size Of Lesion: 1.1
Number Of Hemigard Strips Per Side: 1
Spiral Flap Text: The defect edges were debeveled with a #15 scalpel blade.  Given the location of the defect, shape of the defect and the proximity to free margins a spiral flap was deemed most appropriate.  Using a sterile surgical marker, an appropriate rotation flap was drawn incorporating the defect and placing the expected incisions within the relaxed skin tension lines where possible. The area thus outlined was incised deep to adipose tissue with a #15 scalpel blade.  The skin margins were undermined to an appropriate distance in all directions utilizing iris scissors.
Bi-Rhombic Flap Text: The defect edges were debeveled with a #15 scalpel blade.  Given the location of the defect and the proximity to free margins a bi-rhombic flap was deemed most appropriate.  Using a sterile surgical marker, an appropriate rhombic flap was drawn incorporating the defect. The area thus outlined was incised deep to adipose tissue with a #15 scalpel blade.  The skin margins were undermined to an appropriate distance in all directions utilizing iris scissors.
Where Do You Want The Question To Include Opioid Counseling Located?: Case Summary Tab
Island Pedicle Flap-Requiring Vessel Identification Text: The defect edges were debeveled with a #15 scalpel blade.  Given the location of the defect, shape of the defect and the proximity to free margins an island pedicle advancement flap was deemed most appropriate.  Using a sterile surgical marker, an appropriate advancement flap was drawn, based on the axial vessel mentioned above, incorporating the defect, outlining the appropriate donor tissue and placing the expected incisions within the relaxed skin tension lines where possible.    The area thus outlined was incised deep to adipose tissue with a #15 scalpel blade.  The skin margins were undermined to an appropriate distance in all directions around the primary defect and laterally outward around the island pedicle utilizing iris scissors.  There was minimal undermining beneath the pedicle flap.
Trilobed Flap Text: The defect edges were debeveled with a #15 scalpel blade.  Given the location of the defect and the proximity to free margins a trilobed flap was deemed most appropriate.  Using a sterile surgical marker, an appropriate trilobed flap drawn around the defect.    The area thus outlined was incised deep to adipose tissue with a #15 scalpel blade.  The skin margins were undermined to an appropriate distance in all directions utilizing iris scissors.
Consent Type: Consent 1 (Standard)
Rhomboid Transposition Flap Text: The defect edges were debeveled with a #15 scalpel blade.  Given the location of the defect and the proximity to free margins a rhomboid transposition flap was deemed most appropriate.  Using a sterile surgical marker, an appropriate rhomboid flap was drawn incorporating the defect.    The area thus outlined was incised deep to adipose tissue with a #15 scalpel blade.  The skin margins were undermined to an appropriate distance in all directions utilizing iris scissors.
Bilobed Transposition Flap Text: The defect edges were debeveled with a #15 scalpel blade.  Given the location of the defect and the proximity to free margins a bilobed transposition flap was deemed most appropriate.  Using a sterile surgical marker, an appropriate bilobe flap drawn around the defect.    The area thus outlined was incised deep to adipose tissue with a #15 scalpel blade.  The skin margins were undermined to an appropriate distance in all directions utilizing iris scissors.
Surgeon Performing Repair (Optional): Dr Elliott
Mastoid Interpolation Flap Text: A decision was made to reconstruct the defect utilizing an interpolation axial flap and a staged reconstruction.  A telfa template was made of the defect.  This telfa template was then used to outline the mastoid interpolation flap.  The donor area for the pedicle flap was then injected with anesthesia.  The flap was excised through the skin and subcutaneous tissue down to the layer of the underlying musculature.  The pedicle flap was carefully excised within this deep plane to maintain its blood supply.  The edges of the donor site were undermined.   The donor site was closed in a primary fashion.  The pedicle was then rotated into position and sutured.  Once the tube was sutured into place, adequate blood supply was confirmed with blanching and refill.  The pedicle was then wrapped with xeroform gauze and dressed appropriately with a telfa and gauze bandage to ensure continued blood supply and protect the attached pedicle.
Chonodrocutaneous Helical Advancement Flap Text: The defect edges were debeveled with a #15 scalpel blade.  Given the location of the defect and the proximity to free margins a chondrocutaneous helical advancement flap was deemed most appropriate.  Using a sterile surgical marker, the appropriate advancement flap was drawn incorporating the defect and placing the expected incisions within the relaxed skin tension lines where possible.    The area thus outlined was incised deep to adipose tissue with a #15 scalpel blade.  The skin margins were undermined to an appropriate distance in all directions utilizing iris scissors.
Z Plasty Text: The lesion was extirpated to the level of the fat with a #15 scalpel blade.  Given the location of the defect, shape of the defect and the proximity to free margins a Z-plasty was deemed most appropriate for repair.  Using a sterile surgical marker, the appropriate transposition arms of the Z-plasty were drawn incorporating the defect and placing the expected incisions within the relaxed skin tension lines where possible.    The area thus outlined was incised deep to adipose tissue with a #15 scalpel blade.  The skin margins were undermined to an appropriate distance in all directions utilizing iris scissors.  The opposing transposition arms were then transposed into place in opposite direction and anchored with interrupted buried subcutaneous sutures.
Information: Selecting Yes will display possible errors in your note based on the variables you have selected. This validation is only offered as a suggestion for you. PLEASE NOTE THAT THE VALIDATION TEXT WILL BE REMOVED WHEN YOU FINALIZE YOUR NOTE. IF YOU WANT TO FAX A PRELIMINARY NOTE YOU WILL NEED TO TOGGLE THIS TO 'NO' IF YOU DO NOT WANT IT IN YOUR FAXED NOTE.
W Plasty Text: The lesion was extirpated to the level of the fat with a #15 scalpel blade.  Given the location of the defect, shape of the defect and the proximity to free margins a W-plasty was deemed most appropriate for repair.  Using a sterile surgical marker, the appropriate transposition arms of the W-plasty were drawn incorporating the defect and placing the expected incisions within the relaxed skin tension lines where possible.    The area thus outlined was incised deep to adipose tissue with a #15 scalpel blade.  The skin margins were undermined to an appropriate distance in all directions utilizing iris scissors.  The opposing transposition arms were then transposed into place in opposite direction and anchored with interrupted buried subcutaneous sutures.
Non-Graft Cartilage Fenestration Text: The cartilage was fenestrated with a 2mm punch biopsy to help facilitate healing.
Keystone Flap Text: The defect edges were debeveled with a #15 scalpel blade.  Given the location of the defect, shape of the defect a keystone flap was deemed most appropriate.  Using a sterile surgical marker, an appropriate keystone flap was drawn incorporating the defect, outlining the appropriate donor tissue and placing the expected incisions within the relaxed skin tension lines where possible. The area thus outlined was incised deep to adipose tissue with a #15 scalpel blade.  The skin margins were undermined to an appropriate distance in all directions around the primary defect and laterally outward around the flap utilizing iris scissors.
Previous Accession (Optional): [id number]
Purse String (Intermediate) Text: Given the location of the defect and the characteristics of the surrounding skin a pursestring intermediate closure was deemed most appropriate.  Undermining was performed circumfirentially around the surgical defect.  A purstring suture was then placed and tightened.
Consent (Nose)/Introductory Paragraph: The rationale for Mohs was explained to the patient and consent was obtained. The risks, benefits and alternatives to therapy were discussed in detail. Specifically, the risks of nasal deformity, changes in the flow of air through the nose, infection, scarring, bleeding, prolonged wound healing, incomplete removal, allergy to anesthesia, nerve injury and recurrence were addressed. Prior to the procedure, the treatment site was clearly identified and confirmed by the patient. All components of Universal Protocol/PAUSE Rule completed.
Repair Anesthesia Method: local infiltration
Consent 2/Introductory Paragraph: Mohs surgery was explained to the patient and consent was obtained. The risks, benefits and alternatives to therapy were discussed in detail. Specifically, the risks of infection, scarring, bleeding, prolonged wound healing, incomplete removal, allergy to anesthesia, nerve injury and recurrence were addressed. Prior to the procedure, the treatment site was clearly identified and confirmed by the patient. All components of Universal Protocol/PAUSE Rule completed.
Suturegard Intro: Intraoperative tissue expansion was performed, utilizing the SUTUREGARD device, in order to reduce wound tension.
Wound Care (No Sutures): Petrolatum
Mohs Method Verbiage: An incision at a 45 degree angle following the standard Mohs approach was done and the specimen was harvested as a microscopic controlled layer.
H Plasty Text: Given the location of the defect, shape of the defect and the proximity to free margins a H-plasty was deemed most appropriate for repair.  Using a sterile surgical marker, the appropriate advancement arms of the H-plasty were drawn incorporating the defect and placing the expected incisions within the relaxed skin tension lines where possible. The area thus outlined was incised deep to adipose tissue with a #15 scalpel blade. The skin margins were undermined to an appropriate distance in all directions utilizing iris scissors.  The opposing advancement arms were then advanced into place in opposite direction and anchored with interrupted buried subcutaneous sutures.
Burow's Advancement Flap Text: The defect edges were debeveled with a #15 scalpel blade.  Given the location of the defect and the proximity to free margins a Burow's advancement flap was deemed most appropriate.  Using a sterile surgical marker, the appropriate advancement flap was drawn incorporating the defect and placing the expected incisions within the relaxed skin tension lines where possible.    The area thus outlined was incised deep to adipose tissue with a #15 scalpel blade.  The skin margins were undermined to an appropriate distance in all directions utilizing iris scissors.
Double Island Pedicle Flap Text: The defect edges were debeveled with a #15 scalpel blade.  Given the location of the defect, shape of the defect and the proximity to free margins a double island pedicle advancement flap was deemed most appropriate.  Using a sterile surgical marker, an appropriate advancement flap was drawn incorporating the defect, outlining the appropriate donor tissue and placing the expected incisions within the relaxed skin tension lines where possible.    The area thus outlined was incised deep to adipose tissue with a #15 scalpel blade.  The skin margins were undermined to an appropriate distance in all directions around the primary defect and laterally outward around the island pedicle utilizing iris scissors.  There was minimal undermining beneath the pedicle flap.
Full Thickness Lip Wedge Repair (Flap) Text: Given the location of the defect and the proximity to free margins a full thickness wedge repair was deemed most appropriate.  Using a sterile surgical marker, the appropriate repair was drawn incorporating the defect and placing the expected incisions perpendicular to the vermillion border.  The vermillion border was also meticulously outlined to ensure appropriate reapproximation during the repair.  The area thus outlined was incised through and through with a #15 scalpel blade.  The muscularis and dermis were reaproximated with deep sutures following hemostasis. Care was taken to realign the vermillion border before proceeding with the superficial closure.  Once the vermillion was realigned the superfical and mucosal closure was finished.
Hatchet Flap Text: The defect edges were debeveled with a #15 scalpel blade.  Given the location of the defect, shape of the defect and the proximity to free margins a hatchet flap was deemed most appropriate.  Using a sterile surgical marker, an appropriate hatchet flap was drawn incorporating the defect and placing the expected incisions within the relaxed skin tension lines where possible.    The area thus outlined was incised deep to adipose tissue with a #15 scalpel blade.  The skin margins were undermined to an appropriate distance in all directions utilizing iris scissors.
Melolabial Interpolation Flap Text: A decision was made to reconstruct the defect utilizing an interpolation axial flap and a staged reconstruction.  A telfa template was made of the defect.  This telfa template was then used to outline the melolabial interpolation flap.  The donor area for the pedicle flap was then injected with anesthesia.  The flap was excised through the skin and subcutaneous tissue down to the layer of the underlying musculature.  The pedicle flap was carefully excised within this deep plane to maintain its blood supply.  The edges of the donor site were undermined.   The donor site was closed in a primary fashion.  The pedicle was then rotated into position and sutured.  Once the tube was sutured into place, adequate blood supply was confirmed with blanching and refill.  The pedicle was then wrapped with xeroform gauze and dressed appropriately with a telfa and gauze bandage to ensure continued blood supply and protect the attached pedicle.
Epidermal Autograft Text: The defect edges were debeveled with a #15 scalpel blade.  Given the location of the defect, shape of the defect and the proximity to free margins an epidermal autograft was deemed most appropriate.  Using a sterile surgical marker, the primary defect shape was transferred to the donor site. The epidermal graft was then harvested.  The skin graft was then placed in the primary defect and oriented appropriately.
Double O-Z Flap Text: The defect edges were debeveled with a #15 scalpel blade.  Given the location of the defect, shape of the defect and the proximity to free margins a Double O-Z flap was deemed most appropriate.  Using a sterile surgical marker, an appropriate transposition flap was drawn incorporating the defect and placing the expected incisions within the relaxed skin tension lines where possible. The area thus outlined was incised deep to adipose tissue with a #15 scalpel blade.  The skin margins were undermined to an appropriate distance in all directions utilizing iris scissors.
Mohs Case Number: [id number]
Bilobed Flap Text: The defect edges were debeveled with a #15 scalpel blade.  Given the location of the defect and the proximity to free margins a bilobe flap was deemed most appropriate.  Using a sterile surgical marker, an appropriate bilobe flap drawn around the defect.    The area thus outlined was incised deep to adipose tissue with a #15 scalpel blade.  The skin margins were undermined to an appropriate distance in all directions utilizing iris scissors.
Rhombic Flap Text: The defect edges were debeveled with a #15 scalpel blade.  Given the location of the defect and the proximity to free margins a rhombic flap was deemed most appropriate.  Using a sterile surgical marker, an appropriate for rhombic flap was drawn incorporating the defect.    The area thus outlined was incised deep to adipose tissue with a #15 scalpel blade.  The skin margins were undermined to an appropriate distance in all directions utilizing iris scissors.
Graft Donor Site Dermal Sutures (Optional): 3-0 Vicryl
Consent (Near Eyelid Margin)/Introductory Paragraph: The rationale for Mohs was explained to the patient and consent was obtained. The risks, benefits and alternatives to therapy were discussed in detail. Specifically, the risks of ectropion or eyelid deformity, infection, scarring, bleeding, prolonged wound healing, incomplete removal, allergy to anesthesia, nerve injury and recurrence were addressed. Prior to the procedure, the treatment site was clearly identified and confirmed by the patient. All components of Universal Protocol/PAUSE Rule completed.
Bcc Infiltrative Histology Text: There were numerous aggregates of basaloid cells demonstrating an infiltrative pattern.
Staging Info: By selecting yes to the question above you will include information on AJCC 8 tumor staging in your Mohs note. Information on tumor staging will be automatically added for SCCs on the head and neck. AJCC 8 includes tumor size, tumor depth, perineural involvement and bone invasion.
Tumor Debulked?: curette
Retention Suture Text: Retention sutures were placed to support the closure and prevent dehiscence.
Epidermal Closure: running and interrupted
Alternatives Discussed Intro (Do Not Add Period): I discussed alternative treatments to Mohs surgery and specifically discussed the risks and benefits of
Repair Type: No repair - secondary intention
Primary Defect Width In Cm (Final Defect Size - Required For Flaps/Grafts): 2.2
Number Of Stages: 3
Area M Indication Text: Tumors in this location are included in Area M (cheek, forehead, scalp, neck, jawline and pretibial skin).  Mohs surgery is indicated for tumors 1 cm or larger in these anatomic locations.
Nostril Rim Text: The closure involved the nostril rim.
Length To Time In Minutes Device Was In Place: 10
Consent (Marginal Mandibular)/Introductory Paragraph: The rationale for Mohs was explained to the patient and consent was obtained. The risks, benefits and alternatives to therapy were discussed in detail. Specifically, the risks of damage to the marginal mandibular branch of the facial nerve, infection, scarring, bleeding, prolonged wound healing, incomplete removal, allergy to anesthesia, and recurrence were addressed. Prior to the procedure, the treatment site was clearly identified and confirmed by the patient. All components of Universal Protocol/PAUSE Rule completed.
Detail Level: Detailed
Anesthesia Volume In Cc: 6
Anesthesia Volume In Cc: 4
Initial Size Of Lesion: 1.3
Location Indication Override (Is Already Calculated Based On Selected Body Location): Area H
Debridement Text: The wound edges were debrided prior to proceeding with the closure to facilitate wound healing.
Mohs Case Number: 
Anesthesia Volume In Cc: 2

## 2020-07-30 ENCOUNTER — APPOINTMENT (RX ONLY)
Dept: URBAN - METROPOLITAN AREA CLINIC 24 | Facility: CLINIC | Age: 85
Setting detail: DERMATOLOGY
End: 2020-07-30

## 2020-07-30 DIAGNOSIS — L76.21 POSTPROCEDURAL HEMORRHAGE OF SKIN AND SUBCUTANEOUS TISSUE FOLLOWING A DERMATOLOGIC PROCEDURE: ICD-10-CM

## 2020-07-30 PROCEDURE — ? CAUTERY

## 2020-07-30 PROCEDURE — 99024 POSTOP FOLLOW-UP VISIT: CPT

## 2020-07-30 ASSESSMENT — LOCATION DETAILED DESCRIPTION DERM
LOCATION DETAILED: LEFT SUPERIOR PARIETAL SCALP
LOCATION DETAILED: LEFT INFERIOR CRUS OF ANTIHELIX

## 2020-07-30 ASSESSMENT — LOCATION SIMPLE DESCRIPTION DERM
LOCATION SIMPLE: LEFT EAR
LOCATION SIMPLE: SCALP

## 2020-07-30 ASSESSMENT — LOCATION ZONE DERM
LOCATION ZONE: SCALP
LOCATION ZONE: EAR

## 2020-08-05 ENCOUNTER — APPOINTMENT (RX ONLY)
Dept: URBAN - METROPOLITAN AREA CLINIC 24 | Facility: CLINIC | Age: 85
Setting detail: DERMATOLOGY
End: 2020-08-05

## 2020-08-05 DIAGNOSIS — Z48.01 ENCOUNTER FOR CHANGE OR REMOVAL OF SURGICAL WOUND DRESSING: ICD-10-CM

## 2020-08-05 PROBLEM — L29.8 OTHER PRURITUS: Status: ACTIVE | Noted: 2020-08-05

## 2020-08-05 PROBLEM — L85.3 XEROSIS CUTIS: Status: ACTIVE | Noted: 2020-08-05

## 2020-08-05 PROCEDURE — 99024 POSTOP FOLLOW-UP VISIT: CPT

## 2020-08-05 ASSESSMENT — LOCATION ZONE DERM
LOCATION ZONE: SCALP
LOCATION ZONE: EAR

## 2020-08-05 ASSESSMENT — LOCATION SIMPLE DESCRIPTION DERM
LOCATION SIMPLE: LEFT EAR
LOCATION SIMPLE: POSTERIOR SCALP

## 2020-08-05 ASSESSMENT — LOCATION DETAILED DESCRIPTION DERM
LOCATION DETAILED: LEFT SUPERIOR CRUS OF ANTIHELIX
LOCATION DETAILED: POSTERIOR MID-PARIETAL SCALP

## 2020-08-10 ENCOUNTER — APPOINTMENT (RX ONLY)
Dept: URBAN - METROPOLITAN AREA CLINIC 24 | Facility: CLINIC | Age: 85
Setting detail: DERMATOLOGY
End: 2020-08-10

## 2020-08-10 DIAGNOSIS — Z48.01 ENCOUNTER FOR CHANGE OR REMOVAL OF SURGICAL WOUND DRESSING: ICD-10-CM

## 2020-08-10 PROBLEM — L29.8 OTHER PRURITUS: Status: ACTIVE | Noted: 2020-08-10

## 2020-08-10 PROCEDURE — 99024 POSTOP FOLLOW-UP VISIT: CPT

## 2020-08-10 ASSESSMENT — LOCATION ZONE DERM
LOCATION ZONE: EAR
LOCATION ZONE: SCALP

## 2020-08-10 ASSESSMENT — LOCATION DETAILED DESCRIPTION DERM
LOCATION DETAILED: LEFT SUPERIOR CRUS OF ANTIHELIX
LOCATION DETAILED: POSTERIOR MID-PARIETAL SCALP

## 2020-08-10 ASSESSMENT — LOCATION SIMPLE DESCRIPTION DERM
LOCATION SIMPLE: LEFT EAR
LOCATION SIMPLE: POSTERIOR SCALP

## 2020-08-17 ENCOUNTER — APPOINTMENT (RX ONLY)
Dept: URBAN - METROPOLITAN AREA CLINIC 24 | Facility: CLINIC | Age: 85
Setting detail: DERMATOLOGY
End: 2020-08-17

## 2020-08-17 DIAGNOSIS — Z48.01 ENCOUNTER FOR CHANGE OR REMOVAL OF SURGICAL WOUND DRESSING: ICD-10-CM

## 2020-08-17 PROCEDURE — 99024 POSTOP FOLLOW-UP VISIT: CPT

## 2020-08-17 ASSESSMENT — LOCATION ZONE DERM
LOCATION ZONE: EAR
LOCATION ZONE: SCALP

## 2020-08-17 ASSESSMENT — LOCATION SIMPLE DESCRIPTION DERM
LOCATION SIMPLE: SCALP
LOCATION SIMPLE: LEFT EAR

## 2020-08-17 ASSESSMENT — LOCATION DETAILED DESCRIPTION DERM
LOCATION DETAILED: LEFT SUPERIOR PARIETAL SCALP
LOCATION DETAILED: LEFT ANTIHELIX

## 2020-08-24 ENCOUNTER — APPOINTMENT (RX ONLY)
Dept: URBAN - METROPOLITAN AREA CLINIC 24 | Facility: CLINIC | Age: 85
Setting detail: DERMATOLOGY
End: 2020-08-24

## 2020-08-24 DIAGNOSIS — Z48.01 ENCOUNTER FOR CHANGE OR REMOVAL OF SURGICAL WOUND DRESSING: ICD-10-CM

## 2020-08-24 PROBLEM — Z85.828 PERSONAL HISTORY OF OTHER MALIGNANT NEOPLASM OF SKIN: Status: ACTIVE | Noted: 2020-08-24

## 2020-08-24 PROCEDURE — 99024 POSTOP FOLLOW-UP VISIT: CPT

## 2020-08-24 ASSESSMENT — LOCATION ZONE DERM
LOCATION ZONE: EAR
LOCATION ZONE: SCALP

## 2020-08-24 ASSESSMENT — LOCATION SIMPLE DESCRIPTION DERM
LOCATION SIMPLE: POSTERIOR SCALP
LOCATION SIMPLE: LEFT EAR

## 2020-08-24 ASSESSMENT — LOCATION DETAILED DESCRIPTION DERM
LOCATION DETAILED: LEFT SUPERIOR CRUS OF ANTIHELIX
LOCATION DETAILED: POSTERIOR MID-PARIETAL SCALP

## 2020-08-31 ENCOUNTER — APPOINTMENT (RX ONLY)
Dept: URBAN - METROPOLITAN AREA CLINIC 24 | Facility: CLINIC | Age: 85
Setting detail: DERMATOLOGY
End: 2020-08-31

## 2020-08-31 DIAGNOSIS — Z48.01 ENCOUNTER FOR CHANGE OR REMOVAL OF SURGICAL WOUND DRESSING: ICD-10-CM

## 2020-08-31 PROCEDURE — 99024 POSTOP FOLLOW-UP VISIT: CPT

## 2020-08-31 ASSESSMENT — LOCATION SIMPLE DESCRIPTION DERM
LOCATION SIMPLE: LEFT EAR
LOCATION SIMPLE: POSTERIOR SCALP

## 2020-08-31 ASSESSMENT — LOCATION ZONE DERM
LOCATION ZONE: EAR
LOCATION ZONE: SCALP

## 2020-08-31 ASSESSMENT — LOCATION DETAILED DESCRIPTION DERM
LOCATION DETAILED: POSTERIOR MID-PARIETAL SCALP
LOCATION DETAILED: LEFT SUPERIOR CRUS OF ANTIHELIX

## 2020-09-09 ENCOUNTER — APPOINTMENT (RX ONLY)
Dept: URBAN - METROPOLITAN AREA CLINIC 24 | Facility: CLINIC | Age: 85
Setting detail: DERMATOLOGY
End: 2020-09-09

## 2020-09-09 DIAGNOSIS — Z48.01 ENCOUNTER FOR CHANGE OR REMOVAL OF SURGICAL WOUND DRESSING: ICD-10-CM

## 2020-09-09 PROCEDURE — 99024 POSTOP FOLLOW-UP VISIT: CPT

## 2020-09-09 ASSESSMENT — LOCATION SIMPLE DESCRIPTION DERM
LOCATION SIMPLE: SCALP
LOCATION SIMPLE: LEFT EAR

## 2020-09-09 ASSESSMENT — LOCATION ZONE DERM
LOCATION ZONE: SCALP
LOCATION ZONE: EAR

## 2020-09-09 ASSESSMENT — LOCATION DETAILED DESCRIPTION DERM
LOCATION DETAILED: LEFT ANTIHELIX
LOCATION DETAILED: LEFT SUPERIOR PARIETAL SCALP

## 2020-09-28 ENCOUNTER — APPOINTMENT (RX ONLY)
Dept: URBAN - METROPOLITAN AREA CLINIC 24 | Facility: CLINIC | Age: 85
Setting detail: DERMATOLOGY
End: 2020-09-28

## 2020-09-28 DIAGNOSIS — Z48.01 ENCOUNTER FOR CHANGE OR REMOVAL OF SURGICAL WOUND DRESSING: ICD-10-CM

## 2020-09-28 PROCEDURE — 99024 POSTOP FOLLOW-UP VISIT: CPT

## 2020-09-28 ASSESSMENT — LOCATION ZONE DERM
LOCATION ZONE: EAR
LOCATION ZONE: SCALP

## 2020-09-28 ASSESSMENT — LOCATION DETAILED DESCRIPTION DERM
LOCATION DETAILED: LEFT SUPERIOR PARIETAL SCALP
LOCATION DETAILED: LEFT ANTIHELIX

## 2020-09-28 ASSESSMENT — LOCATION SIMPLE DESCRIPTION DERM
LOCATION SIMPLE: SCALP
LOCATION SIMPLE: LEFT EAR

## 2020-12-02 ENCOUNTER — APPOINTMENT (OUTPATIENT)
Dept: CT IMAGING | Age: 85
End: 2020-12-02
Attending: EMERGENCY MEDICINE
Payer: MEDICARE

## 2020-12-02 ENCOUNTER — HOSPITAL ENCOUNTER (OUTPATIENT)
Age: 85
Setting detail: OBSERVATION
Discharge: NURSING, REHAB OR LONG-TERM CARE | End: 2020-12-08
Attending: EMERGENCY MEDICINE | Admitting: FAMILY MEDICINE
Payer: MEDICARE

## 2020-12-02 ENCOUNTER — APPOINTMENT (OUTPATIENT)
Dept: GENERAL RADIOLOGY | Age: 85
End: 2020-12-02
Attending: EMERGENCY MEDICINE
Payer: MEDICARE

## 2020-12-02 ENCOUNTER — APPOINTMENT (OUTPATIENT)
Dept: ULTRASOUND IMAGING | Age: 85
End: 2020-12-02
Attending: FAMILY MEDICINE
Payer: MEDICARE

## 2020-12-02 DIAGNOSIS — R55 SYNCOPE AND COLLAPSE: Primary | ICD-10-CM

## 2020-12-02 DIAGNOSIS — M79.2 NEURALGIA: ICD-10-CM

## 2020-12-02 DIAGNOSIS — S09.8XXA BLUNT HEAD TRAUMA, INITIAL ENCOUNTER: ICD-10-CM

## 2020-12-02 DIAGNOSIS — R22.2 CHEST WALL MASS: ICD-10-CM

## 2020-12-02 DIAGNOSIS — K76.9 LIVER LESION: ICD-10-CM

## 2020-12-02 DIAGNOSIS — R55 SYNCOPE, UNSPECIFIED SYNCOPE TYPE: ICD-10-CM

## 2020-12-02 PROBLEM — N88.8 CERVICAL MASS: Status: ACTIVE | Noted: 2020-12-02

## 2020-12-02 PROBLEM — R63.4 WEIGHT LOSS: Status: ACTIVE | Noted: 2020-12-02

## 2020-12-02 PROBLEM — N28.9 RENAL INSUFFICIENCY: Status: ACTIVE | Noted: 2020-12-02

## 2020-12-02 LAB
ALBUMIN SERPL-MCNC: 3.6 G/DL (ref 3.2–4.6)
ALBUMIN/GLOB SERPL: 1 {RATIO} (ref 1.2–3.5)
ALP SERPL-CCNC: 111 U/L (ref 50–136)
ALT SERPL-CCNC: 25 U/L (ref 12–65)
ANION GAP SERPL CALC-SCNC: 9 MMOL/L (ref 7–16)
AST SERPL-CCNC: 49 U/L (ref 15–37)
BASOPHILS # BLD: 0 K/UL (ref 0–0.2)
BASOPHILS NFR BLD: 0 % (ref 0–2)
BILIRUB SERPL-MCNC: 0.4 MG/DL (ref 0.2–1.1)
BUN SERPL-MCNC: 44 MG/DL (ref 8–23)
CALCIUM SERPL-MCNC: 10.7 MG/DL (ref 8.3–10.4)
CHLORIDE SERPL-SCNC: 105 MMOL/L (ref 98–107)
CO2 SERPL-SCNC: 24 MMOL/L (ref 21–32)
CREAT SERPL-MCNC: 1.71 MG/DL (ref 0.8–1.5)
DIFFERENTIAL METHOD BLD: ABNORMAL
EOSINOPHIL # BLD: 0.1 K/UL (ref 0–0.8)
EOSINOPHIL NFR BLD: 1 % (ref 0.5–7.8)
ERYTHROCYTE [DISTWIDTH] IN BLOOD BY AUTOMATED COUNT: 13.8 % (ref 11.9–14.6)
FERRITIN SERPL-MCNC: 125 NG/ML (ref 8–388)
GLOBULIN SER CALC-MCNC: 3.5 G/DL (ref 2.3–3.5)
GLUCOSE SERPL-MCNC: 109 MG/DL (ref 65–100)
HCT VFR BLD AUTO: 35.6 % (ref 41.1–50.3)
HGB BLD-MCNC: 11.4 G/DL (ref 13.6–17.2)
IMM GRANULOCYTES # BLD AUTO: 0.1 K/UL (ref 0–0.5)
IMM GRANULOCYTES NFR BLD AUTO: 1 % (ref 0–5)
IRON SATN MFR SERPL: 20 %
IRON SERPL-MCNC: 63 UG/DL (ref 35–150)
LDH SERPL L TO P-CCNC: 750 U/L (ref 110–210)
LYMPHOCYTES # BLD: 0.4 K/UL (ref 0.5–4.6)
LYMPHOCYTES NFR BLD: 4 % (ref 13–44)
MAGNESIUM SERPL-MCNC: 1.9 MG/DL (ref 1.8–2.4)
MCH RBC QN AUTO: 31.1 PG (ref 26.1–32.9)
MCHC RBC AUTO-ENTMCNC: 32 G/DL (ref 31.4–35)
MCV RBC AUTO: 97 FL (ref 79.6–97.8)
MONOCYTES # BLD: 0.5 K/UL (ref 0.1–1.3)
MONOCYTES NFR BLD: 5 % (ref 4–12)
NEUTS SEG # BLD: 8.9 K/UL (ref 1.7–8.2)
NEUTS SEG NFR BLD: 90 % (ref 43–78)
NRBC # BLD: 0 K/UL (ref 0–0.2)
PLATELET # BLD AUTO: 216 K/UL (ref 150–450)
PMV BLD AUTO: 9.3 FL (ref 9.4–12.3)
POTASSIUM SERPL-SCNC: 4.6 MMOL/L (ref 3.5–5.1)
PROT SERPL-MCNC: 7.1 G/DL (ref 6.3–8.2)
RBC # BLD AUTO: 3.67 M/UL (ref 4.23–5.6)
SODIUM SERPL-SCNC: 138 MMOL/L (ref 136–145)
T4 FREE SERPL-MCNC: 1.3 NG/DL (ref 0.78–1.46)
TIBC SERPL-MCNC: 321 UG/DL (ref 250–450)
TSH SERPL DL<=0.005 MIU/L-ACNC: 1.13 UIU/ML
WBC # BLD AUTO: 9.9 K/UL (ref 4.3–11.1)

## 2020-12-02 PROCEDURE — 65270000029 HC RM PRIVATE

## 2020-12-02 PROCEDURE — 70450 CT HEAD/BRAIN W/O DYE: CPT

## 2020-12-02 PROCEDURE — 99218 HC RM OBSERVATION: CPT

## 2020-12-02 PROCEDURE — 74011250636 HC RX REV CODE- 250/636: Performed by: EMERGENCY MEDICINE

## 2020-12-02 PROCEDURE — 85025 COMPLETE CBC W/AUTO DIFF WBC: CPT

## 2020-12-02 PROCEDURE — 83540 ASSAY OF IRON: CPT

## 2020-12-02 PROCEDURE — 71046 X-RAY EXAM CHEST 2 VIEWS: CPT

## 2020-12-02 PROCEDURE — 72125 CT NECK SPINE W/O DYE: CPT

## 2020-12-02 PROCEDURE — 83735 ASSAY OF MAGNESIUM: CPT

## 2020-12-02 PROCEDURE — 80053 COMPREHEN METABOLIC PANEL: CPT

## 2020-12-02 PROCEDURE — 93880 EXTRACRANIAL BILAT STUDY: CPT

## 2020-12-02 PROCEDURE — 74011250636 HC RX REV CODE- 250/636: Performed by: FAMILY MEDICINE

## 2020-12-02 PROCEDURE — 84439 ASSAY OF FREE THYROXINE: CPT

## 2020-12-02 PROCEDURE — 82728 ASSAY OF FERRITIN: CPT

## 2020-12-02 PROCEDURE — 84443 ASSAY THYROID STIM HORMONE: CPT

## 2020-12-02 PROCEDURE — 99284 EMERGENCY DEPT VISIT MOD MDM: CPT

## 2020-12-02 PROCEDURE — 83615 LACTATE (LD) (LDH) ENZYME: CPT

## 2020-12-02 PROCEDURE — 93005 ELECTROCARDIOGRAM TRACING: CPT | Performed by: EMERGENCY MEDICINE

## 2020-12-02 PROCEDURE — 74011250637 HC RX REV CODE- 250/637: Performed by: FAMILY MEDICINE

## 2020-12-02 RX ORDER — ONDANSETRON 2 MG/ML
4 INJECTION INTRAMUSCULAR; INTRAVENOUS
Status: DISCONTINUED | OUTPATIENT
Start: 2020-12-02 | End: 2020-12-08 | Stop reason: HOSPADM

## 2020-12-02 RX ORDER — ATORVASTATIN CALCIUM 10 MG/1
10 TABLET, FILM COATED ORAL
Status: DISCONTINUED | OUTPATIENT
Start: 2020-12-02 | End: 2020-12-08 | Stop reason: HOSPADM

## 2020-12-02 RX ORDER — ACETAMINOPHEN 650 MG/1
650 SUPPOSITORY RECTAL
Status: DISCONTINUED | OUTPATIENT
Start: 2020-12-02 | End: 2020-12-08 | Stop reason: HOSPADM

## 2020-12-02 RX ORDER — POLYETHYLENE GLYCOL 3350 17 G/17G
17 POWDER, FOR SOLUTION ORAL DAILY PRN
Status: DISCONTINUED | OUTPATIENT
Start: 2020-12-02 | End: 2020-12-08 | Stop reason: HOSPADM

## 2020-12-02 RX ORDER — LORATADINE 10 MG/1
10 TABLET ORAL DAILY
Status: DISCONTINUED | OUTPATIENT
Start: 2020-12-03 | End: 2020-12-08 | Stop reason: HOSPADM

## 2020-12-02 RX ORDER — ACETAMINOPHEN 325 MG/1
650 TABLET ORAL
Status: DISCONTINUED | OUTPATIENT
Start: 2020-12-02 | End: 2020-12-08 | Stop reason: HOSPADM

## 2020-12-02 RX ORDER — SODIUM CHLORIDE 9 MG/ML
50 INJECTION, SOLUTION INTRAVENOUS CONTINUOUS
Status: DISCONTINUED | OUTPATIENT
Start: 2020-12-02 | End: 2020-12-04

## 2020-12-02 RX ORDER — TRAMADOL HYDROCHLORIDE 50 MG/1
50 TABLET ORAL
Status: DISCONTINUED | OUTPATIENT
Start: 2020-12-02 | End: 2020-12-08 | Stop reason: HOSPADM

## 2020-12-02 RX ORDER — ASPIRIN 81 MG/1
81 TABLET ORAL DAILY
Status: DISCONTINUED | OUTPATIENT
Start: 2020-12-03 | End: 2020-12-08 | Stop reason: HOSPADM

## 2020-12-02 RX ADMIN — SODIUM CHLORIDE 500 ML: 900 INJECTION, SOLUTION INTRAVENOUS at 15:02

## 2020-12-02 RX ADMIN — SODIUM CHLORIDE 50 ML/HR: 900 INJECTION, SOLUTION INTRAVENOUS at 18:45

## 2020-12-02 RX ADMIN — ATORVASTATIN CALCIUM 10 MG: 10 TABLET, FILM COATED ORAL at 22:25

## 2020-12-02 NOTE — PROGRESS NOTES
ELLE met with patient and his wife. Patient's wife states that they live alone but have two daughters who live locally. The patient has dementia and could not provide SW with background information. The patient's wife states that she is in the process of obtaining a room at [facility] in their [address]. She is currently waiting for a spot to open up. ELLE advised by RN that the patient and his wife are planning on selling their home and are not asking their children to help them with moving. Patient's wife states that she doesn't want to \"bother\" them. ELLE provided a flyer on [employer] and private duty sitters for the meantime. Patient's wife reports that their children are supportive, she just doesn't want to rely on them unless she has to.     Dorothy Antonio, EMILEEW    [address] [facility]    * [email or web address]

## 2020-12-02 NOTE — ED NOTES
TRANSFER - OUT REPORT:    Verbal report given to ALVINA Lang on Hernan Veloz  being transferred to [address] [place] [address] for routine progression of care       Report consisted of patients Situation, Background, Assessment and   Recommendations(SBAR). Information from the following report(s) SBAR, Intake/Output, MAR and Cardiac Rhythm NSR was reviewed with the receiving nurse. Lines:   Peripheral IV 12/02/20 Left Antecubital (Active)   Site Assessment Clean, dry, & intact 12/02/20 1424   Phlebitis Assessment 0 12/02/20 1424   Infiltration Assessment 0 12/02/20 1424   Dressing Status Clean, dry, & intact 12/02/20 1424        Opportunity for questions and clarification was provided.       Patient transported with:   Registered Nurse

## 2020-12-02 NOTE — H&P
Hospitalist Admission History and Physical     NAME:  Ashleigh Munoz   Age:  80 y.o.  :   1932   MRN:   [id number]  PCP: Breanne Iyer MD  Consulting MD:  Treatment Team: Attending Provider: Rosalio Knight DO; Primary Nurse: Walter Hodgson RN; : Kristy Ho    Chief Complaint   Patient presents with   Mina Lowry         HPI:   Patient is a 80 y.o. male who presented to the ED for cc fall after syncopal event in garage. Event was not witnessed. He states he was climbing his stairs and bent down to change his shoes. When standing back up he became dizzy and hit head. Hx of HLD, chronic pain. Also admits to 50 pound weight loss in last 6 months. Does not recall family hx of cancer. Vitals - /86    Labs- Hg 11.4. CT cervical spine showed multiple enlarged lymph nodes within the right neck one  of which measures greater than 3 cm in size. A partially imaged mass within the  left infraclavicular region measures at least 4 cm in size. The findings would be concerning for metastatic disease or lymphoma. Past Medical History:   Diagnosis Date    Mixed hyperlipidemia 2013    Neuralgia 2013        Past Surgical History:   Procedure Laterality Date    HX CATARACT REMOVAL          No family history on file. Social History     Social History Narrative    Not on file        Social History     Tobacco Use    Smoking status: Never Smoker    Smokeless tobacco: Never Used   Substance Use Topics    Alcohol use: No        Social History     Substance and Sexual Activity   Drug Use No         No Known Allergies    Prior to Admission medications    Medication Sig Start Date End Date Taking? Authorizing Provider   atorvastatin (LIPITOR) 10 mg tablet take 1 tablet by mouth once daily 20   Breanne Iyer MD   traMADoL (ULTRAM) 50 mg tablet Take 1 Tab by mouth every six (6) hours as needed for Pain for up to 180 days. Max Daily Amount: 200 mg.  TAKE 1 TABLET EVERY 6 HOURS 7/9/20 1/5/21  Mary Cummings MD   levocetirizine (XYZAL) 5 mg tablet Take 1 Tab by mouth daily. Indications: Allergic Rhinitis 6/7/18   Bill Santos MD   aspirin delayed-release 81 mg tablet Take  by mouth daily. Provider, Historical           Review of Systems    Constitutional: NAD  Eyes:  no change in visual acuity, no photophobia  Ears, nose, mouth, throat, and face: no  Odynphagia, dysphagia, no thrush or exudate, negative for chronic sinus congestion, recurrent headaches  Respiratory: negative for SOB, hemoptysis or cough  Cardiovascular: negative for CP, palpitations, or PND  Gastrointestinal: Poor PO intake with weight loss. Genitourinary: no urgency, frequency, or dysuria, no nocturia  Integument/breast: negative for skin rash or skin lesions  Hematologic/lymphatic: negative for known bleeding disorder  Musculoskeletal:negative for joint pain or joint tenderness  Neurological: generalized weakness, recent syncope today   Behavioral/Psych: negative for depression or chronic anxiety,   Endocrine: negative for polydyspia, polyuria or intolerance to heat or cold  Allergic/Immunologic: negative for chronic allergic rhinitis, or known connective tissue disorder      Objective:     Visit Vitals  BP (!) 181/86 (BP 1 Location: Right arm, BP Patient Position: At rest)   Pulse 100   Resp 16   Ht 6' 1\" (1.854 m)   Wt 71.2 kg (157 lb)   SpO2 98%   BMI 20.71 kg/m²        No intake/output data recorded. No intake/output data recorded.     Data Review:   Recent Results (from the past 24 hour(s))   METABOLIC PANEL, COMPREHENSIVE    Collection Time: 12/02/20  2:21 PM   Result Value Ref Range    Sodium 138 136 - 145 mmol/L    Potassium 4.6 3.5 - 5.1 mmol/L    Chloride 105 98 - 107 mmol/L    CO2 24 21 - 32 mmol/L    Anion gap 9 7 - 16 mmol/L    Glucose 109 (H) 65 - 100 mg/dL    BUN 44 (H) 8 - 23 MG/DL    Creatinine 1.71 (H) 0.8 - 1.5 MG/DL    GFR est AA 49 (L) >60 ml/min/1.73m2    GFR est non-AA 40 (L) >60 ml/min/1.73m2    Calcium 10.7 (H) 8.3 - 10.4 MG/DL    Bilirubin, total 0.4 0.2 - 1.1 MG/DL    ALT (SGPT) 25 12 - 65 U/L    AST (SGOT) 49 (H) 15 - 37 U/L    Alk. phosphatase 111 50 - 136 U/L    Protein, total 7.1 6.3 - 8.2 g/dL    Albumin 3.6 3.2 - 4.6 g/dL    Globulin 3.5 2.3 - 3.5 g/dL    A-G Ratio 1.0 (L) 1.2 - 3.5     CBC WITH AUTOMATED DIFF    Collection Time: 12/02/20  2:21 PM   Result Value Ref Range    WBC 9.9 4.3 - 11.1 K/uL    RBC 3.67 (L) 4.23 - 5.6 M/uL    HGB 11.4 (L) 13.6 - 17.2 g/dL    HCT 35.6 (L) 41.1 - 50.3 %    MCV 97.0 79.6 - 97.8 FL    MCH 31.1 26.1 - 32.9 PG    MCHC 32.0 31.4 - 35.0 g/dL    RDW 13.8 11.9 - 14.6 %    PLATELET 202 615 - 155 K/uL    MPV 9.3 (L) 9.4 - 12.3 FL    ABSOLUTE NRBC 0.00 0.0 - 0.2 K/uL    DF AUTOMATED      NEUTROPHILS 90 (H) 43 - 78 %    LYMPHOCYTES 4 (L) 13 - 44 %    MONOCYTES 5 4.0 - 12.0 %    EOSINOPHILS 1 0.5 - 7.8 %    BASOPHILS 0 0.0 - 2.0 %    IMMATURE GRANULOCYTES 1 0.0 - 5.0 %    ABS. NEUTROPHILS 8.9 (H) 1.7 - 8.2 K/UL    ABS. LYMPHOCYTES 0.4 (L) 0.5 - 4.6 K/UL    ABS. MONOCYTES 0.5 0.1 - 1.3 K/UL    ABS. EOSINOPHILS 0.1 0.0 - 0.8 K/UL    ABS. BASOPHILS 0.0 0.0 - 0.2 K/UL    ABS. IMM. GRANS. 0.1 0.0 - 0.5 K/UL   MAGNESIUM    Collection Time: 12/02/20  2:21 PM   Result Value Ref Range    Magnesium 1.9 1.8 - 2.4 mg/dL       Physical Exam:     General:  Alert, cooperative, hard of hearing. Superficial skin tear to posterior head    Eyes:  Conjunctivae/corneas clear. PERRL, EOMs intact. Fundi benign   Ears:  Normal TMs and external ear canals both ears. Nose: Nares normal. Septum midline. Mouth/Throat: Wearing mask. Large, hard fixated masses to right lateral neck and anterior neck. Neck: no JVD. Back:   deferred   Lungs:   Clear to auscultation bilaterally. Heart:  Regular rate and rhythm, S1, S2 normal   Abdomen:   Soft, non-tender. Bowel sounds normal. No masses,  No organomegaly. Extremities: Large mass to left anterior chest that is 7CM X 8CM.  Hard mass that does not move in left axilla. Muscle wasting. Cachetic. Pulses: 2+ and symmetric all extremities. Skin: Superficial skin tears to arms, posterior head   Lymph nodes: Cervical, supraclavicular, and axillary nodes normal.   Neurologic: NO obvious confusion on exam.      Assessment and Plan     Principal Problem:    Syncope (12/2/2020)    Active Problems:    Mixed hyperlipidemia (4/4/2013)      Weight loss (12/2/2020)      Cervical mass (12/2/2020)      Renal insufficiency (12/2/2020)    Syncope - Possibly orthostatic? Vasovagal since from bending to standing position? Order orthostatic BP. Place on cardiac monitor. Order ECHO. CHeck TSH/T4. US carotids. Since has not been eating well for 6 months, will give gentle hydration. Order UA to ensure no UTI affecting mentation. Renal insufficiency - Mild, give gentle hydration. Cervical mass with weight loss - Concern for lymphoma. Order CT with contrast once creatine improved which hopefully will be tomorrow. Head trauma. No bleeding on CT head    ASA/statin    Of note, BP elevated on arrival. Recheck BP.      Patient wishes to be DNR    DVT prophylaxis - SCDs    Signed By: Shayan Thakkar DO   December 2, 2020

## 2020-12-02 NOTE — ED TRIAGE NOTES
Pt arrived to ED via EMS from home. Pt fell in the garage. Per EMS patient fell backwards hitting the back of his head. Fall was unwitnessed. Pt denies any loc. Pt masked upon arrival to the ED.        Per EMS  172/84  98  99% on room air  98.9

## 2020-12-02 NOTE — ED PROVIDER NOTES
Patient is an 51-year-old male presenting the emergency department day after a fall with syncopal event in the garage. This was unwitnessed. Patient states that he does not remember the incident. He says that he does not have these events. He is complaining of some pain to the back of his head. Takes baby aspirin once daily but no other blood thinners. The patient denies any pain in the chest abdomen or extremities. I spoke to the patient's wife and she states that she was in the house putting groceries up and realized he would been gone for longer than she had expected and all the groceries were not in so she went out to the garage and found him laying there on the floor with blood around him. Past Medical History:   Diagnosis Date    Mixed hyperlipidemia 4/4/2013    Neuralgia 4/4/2013       Past Surgical History:   Procedure Laterality Date    HX CATARACT REMOVAL           No family history on file.     Social History     Socioeconomic History    Marital status:      Spouse name: Not on file    Number of children: Not on file    Years of education: Not on file    Highest education level: Not on file   Occupational History    Not on file   Social Needs    Financial resource strain: Not on file    Food insecurity     Worry: Not on file     Inability: Not on file    Transportation needs     Medical: Not on file     Non-medical: Not on file   Tobacco Use    Smoking status: Never Smoker    Smokeless tobacco: Never Used   Substance and Sexual Activity    Alcohol use: No    Drug use: No    Sexual activity: Not on file   Lifestyle    Physical activity     Days per week: Not on file     Minutes per session: Not on file    Stress: Not on file   Relationships    Social connections     Talks on phone: Not on file     Gets together: Not on file     Attends Hoahaoism service: Not on file     Active member of club or organization: Not on file     Attends meetings of clubs or organizations: Not on file     Relationship status: Not on file    Intimate partner violence     Fear of current or ex partner: Not on file     Emotionally abused: Not on file     Physically abused: Not on file     Forced sexual activity: Not on file   Other Topics Concern    Not on file   Social History Narrative    Not on file         ALLERGIES: Patient has no known allergies. Review of Systems   Neurological: Positive for syncope. All other systems reviewed and are negative. Vitals:    12/02/20 1328   BP: (!) 181/86   Pulse: 100   Resp: 16   SpO2: 98%   Weight: 71.2 kg (157 lb)   Height: 6' 1\" (1.854 m)            Physical Exam     GENERAL:The patient has Body mass index is 20.71 kg/m². Well-hydrated. VITAL SIGNS: Heart rate, blood pressure, respiratory rate reviewed as recorded in  nurse's notes  HEAD: negative nesbitt and raccoon sign. Skin abrasion with slight bleeding to the occiput. No obvious skull fracture appreciated. EYES: Pupils reactive. Extraocular motion intact. No conjunctival redness or drainage. EARS: No external masses or lesions. External auditory canals are clear with no  hemotympanum  NOSE: No nasal drainage or epistaxis. No septal hematoma noted  MOUTH/THROAT: Pharynx clear; airway patent. No loose dentition or intraoral  lacerations. Floor the mouth is soft. NECK: Patient has significant firm lymphadenopathy right greater than left. LUNGS: Breath sounds clear and equal bilaterally no accessory muscle use  CHEST: No crepitus appreciated. The patient does have enlargement of the left axillary and anterior chest wall region which is firm. This is been present for approximately 1 year. CARDIOVASCULAR: Regular rate and rhythm  ABDOMEN: Soft without tenderness. No palpable masses or organomegaly. No  peritoneal signs. No rigidity. PELVIS: stable no laxity  EXTREMITIES: No clubbing or cyanosis. No joint swelling. Normal muscle tone.  No  restricted range of motion appreciated. NEUROLOGIC: Sensation is grossly intact. Cranial nerve exam reveals face is  symmetrical, tongue is midline speech is clear. Good sensation V1 through 3. Negative pronator drift in the arms and legs.  strength 5-5 equal bilaterally. SKIN: No rash or petechiae. Good skin turgor palpated. PSYCHIATRIC: Alert and oriented. Appropriate behavior and judgment. MDM  Number of Diagnoses or Management Options  Diagnosis management comments: TIA, CVA, intracranial hemorrhage, ischemic stroke, brain tumor, Bell's palsy,    tension headache, migraine headache,    peripheral neuropathy, metabolic disorder, Guillian [place] syndrome, multiple sclerosis,    electrolyte abnormality           Amount and/or Complexity of Data Reviewed  Clinical lab tests: ordered and reviewed  Tests in the radiology section of CPT®: ordered and reviewed  Tests in the medicine section of CPT®: ordered and reviewed  Review and summarize past medical records: yes  Independent visualization of images, tracings, or specimens: yes      ED Course as of Dec 02 1555   Wed Dec 02, 2020   1415 IMPRESSION:  1. No evidence of acute intracranial hemorrhage. 2. No evidence of acute cervical spine fracture. 3. Extensive neck adenopathy. The findings would be concerning for metastatic  disease or lymphoma. 4. Moderate right maxillary sinus opacification. CT HEAD WO CONT [KH]   1435 Impression: Large hiatal hernia. XR CHEST PA LAT [KH]   1456 Creatinine(!): 1.71 [KH]   1456 BUN(!): 44 [KH]   1535 I spoke to Dr. Elvi Hdz the hospitalist who will come and see the patient and plan on admitting him to the hospital secondary to his unexplained syncopal event. [TW]   7845 I spoke to the patient and his wife about the findings and need for further observation. They are agreeable with this plan.     [KH]      ED Course User Index  [KH] Neha Torres,        Procedures

## 2020-12-03 ENCOUNTER — APPOINTMENT (OUTPATIENT)
Dept: CT IMAGING | Age: 85
End: 2020-12-03
Attending: FAMILY MEDICINE
Payer: MEDICARE

## 2020-12-03 LAB
ANION GAP SERPL CALC-SCNC: 6 MMOL/L (ref 7–16)
ATRIAL RATE: 88 BPM
BUN SERPL-MCNC: 36 MG/DL (ref 8–23)
CALCIUM SERPL-MCNC: 10.1 MG/DL (ref 8.3–10.4)
CALCULATED P AXIS, ECG09: 64 DEGREES
CALCULATED R AXIS, ECG10: 2 DEGREES
CALCULATED T AXIS, ECG11: 68 DEGREES
CHLORIDE SERPL-SCNC: 107 MMOL/L (ref 98–107)
CO2 SERPL-SCNC: 26 MMOL/L (ref 21–32)
CREAT SERPL-MCNC: 1.39 MG/DL (ref 0.8–1.5)
DIAGNOSIS, 93000: NORMAL
ERYTHROCYTE [DISTWIDTH] IN BLOOD BY AUTOMATED COUNT: 13.9 % (ref 11.9–14.6)
GLUCOSE SERPL-MCNC: 74 MG/DL (ref 65–100)
HCT VFR BLD AUTO: 33.3 % (ref 41.1–50.3)
HGB BLD-MCNC: 10.7 G/DL (ref 13.6–17.2)
MCH RBC QN AUTO: 30.5 PG (ref 26.1–32.9)
MCHC RBC AUTO-ENTMCNC: 32.1 G/DL (ref 31.4–35)
MCV RBC AUTO: 94.9 FL (ref 79.6–97.8)
NRBC # BLD: 0 K/UL (ref 0–0.2)
P-R INTERVAL, ECG05: 194 MS
PLATELET # BLD AUTO: 225 K/UL (ref 150–450)
PMV BLD AUTO: 9.5 FL (ref 9.4–12.3)
POTASSIUM SERPL-SCNC: 4.1 MMOL/L (ref 3.5–5.1)
Q-T INTERVAL, ECG07: 370 MS
QRS DURATION, ECG06: 124 MS
QTC CALCULATION (BEZET), ECG08: 447 MS
RBC # BLD AUTO: 3.51 M/UL (ref 4.23–5.6)
SODIUM SERPL-SCNC: 139 MMOL/L (ref 136–145)
VENTRICULAR RATE, ECG03: 88 BPM
WBC # BLD AUTO: 6.8 K/UL (ref 4.3–11.1)

## 2020-12-03 PROCEDURE — 99218 HC RM OBSERVATION: CPT

## 2020-12-03 PROCEDURE — 74011000250 HC RX REV CODE- 250: Performed by: FAMILY MEDICINE

## 2020-12-03 PROCEDURE — 77030040393 HC DRSG OPTIFOAM GENT MDII -B

## 2020-12-03 PROCEDURE — 65270000029 HC RM PRIVATE

## 2020-12-03 PROCEDURE — 80048 BASIC METABOLIC PNL TOTAL CA: CPT

## 2020-12-03 PROCEDURE — 36415 COLL VENOUS BLD VENIPUNCTURE: CPT

## 2020-12-03 PROCEDURE — 74011000636 HC RX REV CODE- 636: Performed by: FAMILY MEDICINE

## 2020-12-03 PROCEDURE — 74011250637 HC RX REV CODE- 250/637: Performed by: FAMILY MEDICINE

## 2020-12-03 PROCEDURE — 74011250636 HC RX REV CODE- 250/636: Performed by: FAMILY MEDICINE

## 2020-12-03 PROCEDURE — 2709999900 HC NON-CHARGEABLE SUPPLY

## 2020-12-03 PROCEDURE — C8929 TTE W OR WO FOL WCON,DOPPLER: HCPCS

## 2020-12-03 PROCEDURE — 85027 COMPLETE CBC AUTOMATED: CPT

## 2020-12-03 PROCEDURE — 74011000258 HC RX REV CODE- 258: Performed by: FAMILY MEDICINE

## 2020-12-03 PROCEDURE — 70491 CT SOFT TISSUE NECK W/DYE: CPT

## 2020-12-03 RX ORDER — SODIUM CHLORIDE 0.9 % (FLUSH) 0.9 %
10 SYRINGE (ML) INJECTION
Status: COMPLETED | OUTPATIENT
Start: 2020-12-03 | End: 2020-12-03

## 2020-12-03 RX ADMIN — Medication 81 MG: at 08:24

## 2020-12-03 RX ADMIN — LORATADINE 10 MG: 10 TABLET ORAL at 08:24

## 2020-12-03 RX ADMIN — SODIUM CHLORIDE 100 ML: 900 INJECTION, SOLUTION INTRAVENOUS at 14:01

## 2020-12-03 RX ADMIN — SODIUM CHLORIDE 50 ML/HR: 900 INJECTION, SOLUTION INTRAVENOUS at 12:22

## 2020-12-03 RX ADMIN — TRAMADOL HYDROCHLORIDE 50 MG: 50 TABLET, FILM COATED ORAL at 11:52

## 2020-12-03 RX ADMIN — Medication 10 ML: at 14:01

## 2020-12-03 RX ADMIN — DIATRIZOATE MEGLUMINE AND DIATRIZOATE SODIUM 15 ML: 660; 100 LIQUID ORAL; RECTAL at 11:53

## 2020-12-03 RX ADMIN — PERFLUTREN 1 ML: 6.52 INJECTION, SUSPENSION INTRAVENOUS at 08:00

## 2020-12-03 RX ADMIN — IOPAMIDOL 100 ML: 755 INJECTION, SOLUTION INTRAVENOUS at 14:01

## 2020-12-03 RX ADMIN — ATORVASTATIN CALCIUM 10 MG: 10 TABLET, FILM COATED ORAL at 21:42

## 2020-12-03 NOTE — PROGRESS NOTES
Problem: Falls - Risk of  Goal: *Absence of Falls  Description: Document Ivin Turner Fall Risk and appropriate interventions in the flowsheet.   Outcome: Progressing Towards Goal  Note: Fall Risk Interventions:  Mobility Interventions: Bed/chair exit alarm, OT consult for ADLs, Patient to call before getting OOB, PT Consult for mobility concerns, PT Consult for assist device competence, Strengthening exercises (ROM-active/passive), Utilize walker, cane, or other assistive device         Medication Interventions: Patient to call before getting OOB, Teach patient to arise slowly    Elimination Interventions: Call light in reach, Patient to call for help with toileting needs              Problem: Patient Education: Go to Patient Education Activity  Goal: Patient/Family Education  Outcome: Progressing Towards Goal

## 2020-12-03 NOTE — PROGRESS NOTES
Care Management Interventions  Mode of Transport at Discharge: BLS  Transition of Care Consult (CM Consult): SNF, Discharge Planning  Physical Therapy Consult: Yes  Occupational Therapy Consult: Yes  Current Support Network: Lives with Spouse  Confirm Follow Up Transport: Family  The Patient and/or Patient Representative was Provided with a Choice of Provider and Agrees with the Discharge Plan?: Yes  Freedom of Choice List was Provided with Basic Dialogue that Supports the Patient's Individualized Plan of Care/Goals, Treatment Preferences and Shares the Quality Data Associated with the Providers?: Yes  Discharge Location  Discharge Placement: Unable to determine at this time    Sw reviewed chart and read ER Sw note. Pt is an 80 yr old gentleman who apparently was climbing on a ladder in the garage and fell. Per Sw note wife informed she was working on an [facility] bed at [facility]. This Sw called and spoke with Isa and Brittni Dickson at [facility]. Brittni Dickson (adm for [facility]) reported that the wife called 2 days ago in an absolute panic stating they could not get food, she couldn't take care of him any more, and didn't want their 2 daughters contacted. Brittni Dickson felt so uneasy/upset by this panicked emergency call that she almost called APS or 's Dept to do a safety check. No paperwork has been started, no financials have been reviewed at [facility], and now pt has fallen and is hospitalized. Per medical notes (CT) pt may have lymphoma or metastatic cancer as he has large masses in neck and chest. Unsure what the final discharge plan will be but Brittni Dickson at [facility] in [facility] stated they had beds and would be glad to accept him. Unsure what is going on with the 2 daughters. Anahy Odell ([facility]) and Hafsa Couch([place]). Not sure why they are not calling, visiting and checking in on their parents. Wife is super concerned with \"not bothering them\".  Sw will cont to follow and assist. Hopefully pt will be able to participate in therapy and go to [facility] for some short term rehab and then transition into their long term beds once his condition deteriorates. Sw opened CC link for [facility] in the event pt needs that level of care.  [facility]

## 2020-12-03 NOTE — PROGRESS NOTES
Shift assessment completed via flow sheet. Pt a/o x 4, respirations are even and unlabored. Lung sounds CTA bilaterally. Heart sounds S1 and S2 noted with no murmurs. Abdomen is soft and not distended. Pt denies any needs at this time. Safety measures in place. Bed low and locked. Call light within reach. Instructed pt to call for assistance. Will continue to monitor.

## 2020-12-03 NOTE — PROGRESS NOTES
END OF SHIFT NOTE:  Patient's CT Scans and 2 echo completed. Oncology consult called as directed. Fair po intake. Patient's wife in for visit. Patient medicated for back pain once with Ultram 50 mg po as directed. Intake/Output  12/03 0701 - 12/03 1900  In: 6602 [P.O.:720; I.V.:837]  Out: 150 [Urine:150] plus incontinence x1 one in bed attempt to use urinal.   Voiding:  Yes   Catheter: No   Drain:              Stool:   One  occurrences. Stool Assessment  Stool Color: Brown (12/03/20 1500)  Stool Appearance: Soft (12/03/20 1500)  Stool Amount: Smear (12/03/20 1500)  Stool Source/Status: Rectum (12/03/20 1500)    Emesis:   No  occurrences. VITAL SIGNS  Patient Vitals for the past 12 hrs:   Temp Pulse Resp BP SpO2   12/03/20 1108 97.3 °F (36.3 °C) 88 18 113/64 98 %   12/03/20 0729 97.6 °F (36.4 °C) 90 18 (!) 123/52 94 %       Pain Assessment  Pain 1  Pain Scale 1: Numeric (0 - 10) (12/03/20 1810)  Pain Intensity 1: 0 (12/03/20 1810)  Patient Stated Pain Goal: 0 (12/02/20 1657)  Pain Location 1: Back (12/03/20 1152)  Pain Orientation 1: Lower (12/03/20 1152)  Pain Description 1: Aching (12/03/20 1152)  Pain Intervention(s) 1: Medication (see MAR) (12/03/20 1152)    Ambulating  Yes in room with assistance bathroom     Additional Information:  See above     Shift report given to oncoming nurse Aftab Gusman, ALVINA  at the bedside.     Osbaldo Zuluaga RN

## 2020-12-03 NOTE — PROGRESS NOTES
Progress Note    Patient: Audra Gee MRN: [id number]  SSN: [id number]    YOB: 1932  Age: 80 y.o. Sex: male      Admit Date: 12/2/2020    LOS: 1 day     Subjective:   F/U syncope, neck masses    80 y.o. male who presented to the ED for cc fall after syncopal event in garage. Event was not witnessed. He states he was climbing his stairs and bent down to change his shoes. When standing back up he became dizzy and hit head. Hx of HLD, chronic pain. Also admits to 50 pound weight loss in last 6 months. Does not recall family hx of cancer. Hg 11.4. CT cervical spine showed multiple enlarged lymph nodes within the right neck one of which measures greater than 3 cm in size. A partially imaged mass within the left infraclavicular region measures at least 4 cm in size. The findings would be concerning for metastatic disease or lymphoma. Patient was admitted for syncope along with further work up for possible metastatic disease. . Iron studies normal. TSH normal. US carotids with no significant stenosis. ECHO with no significant structural abnormality. Creatine improved to 1.3. No abnormal rhythm. Feeling better today. No chest pain or SOB.    Current Facility-Administered Medications   Medication Dose Route Frequency    sodium chloride 0.9 % bolus infusion 100 mL  100 mL IntraVENous RAD ONCE    iopamidoL (ISOVUE-370) 76 % injection 100 mL  100 mL IntraVENous RAD ONCE    saline peripheral flush soln 10 mL  10 mL InterCATHeter RAD ONCE    acetaminophen (TYLENOL) tablet 650 mg  650 mg Oral Q6H PRN    Or    acetaminophen (TYLENOL) suppository 650 mg  650 mg Rectal Q6H PRN    polyethylene glycol (MIRALAX) packet 17 g  17 g Oral DAILY PRN    ondansetron (ZOFRAN) injection 4 mg  4 mg IntraVENous Q6H PRN    aspirin delayed-release tablet 81 mg  81 mg Oral DAILY    atorvastatin (LIPITOR) tablet 10 mg  10 mg Oral QHS    traMADoL (ULTRAM) tablet 50 mg  50 mg Oral Q6H PRN    0.9% sodium chloride infusion  50 mL/hr IntraVENous CONTINUOUS    loratadine (CLARITIN) tablet 10 mg  10 mg Oral DAILY       Objective:     Vitals:    12/03/20 0239 12/03/20 0400 12/03/20 0729 12/03/20 1108   BP: (!) 144/75  (!) 123/52 113/64   Pulse: 88 89 90 88   Resp: 18  18 18   Temp: 99.3 °F (37.4 °C)  97.6 °F (36.4 °C) 97.3 °F (36.3 °C)   SpO2: 97%  94% 98%   Weight:       Height:             Intake and Output:  Current Shift: 12/03 0701 - 12/03 1900  In: 837 [I.V.:837]  Out: 150 [Urine:150]  Last three shifts: 12/01 1901 - 12/03 0700  In: -   Out: 350 [Urine:350]    Physical Exam:   General:  Alert, cooperative, no distress, hard of hearing. No distress. Superficial skin tear to posterior head. Frail appearing   Eyes:  Conjunctivae/corneas clear. Ears:  Normal TMs and external ear canals both ears. Nose: Nares normal. Septum midline. Mouth/Throat: Lips, mucosa, and tongue normal.   Neck: Right lateral and anterior neck mass   Back:   deferred   Lungs:   Clear to auscultation bilaterally. Heart:  Regular rate and rhythm, S1, S2 normal   Abdomen:   Soft, non-tender. Bowel sounds normal.    Extremities: Muscle wasting. Cachetic    Pulses: 2+ and symmetric all extremities. Skin: Superficial skin tears to arms and posterior head    Lymph nodes: Cervical, supraclavicular, and axillary nodes normal.   Neurologic: CNII-XII intact. Good movement in bed.         Lab/Data Review:    Recent Results (from the past 24 hour(s))   EKG, 12 LEAD, INITIAL    Collection Time: 12/02/20  2:12 PM   Result Value Ref Range    Ventricular Rate 88 BPM    Atrial Rate 88 BPM    P-R Interval 194 ms    QRS Duration 124 ms    Q-T Interval 370 ms    QTC Calculation (Bezet) 447 ms    Calculated P Axis 64 degrees    Calculated R Axis 2 degrees    Calculated T Axis 68 degrees    Diagnosis       Normal sinus rhythm  Septal infarct , age undetermined  Abnormal ECG  When compared with ECG of 21-MAR-2002 18:59,  QRS duration has increased  Septal infarct is now Present     METABOLIC PANEL, COMPREHENSIVE    Collection Time: 12/02/20  2:21 PM   Result Value Ref Range    Sodium 138 136 - 145 mmol/L    Potassium 4.6 3.5 - 5.1 mmol/L    Chloride 105 98 - 107 mmol/L    CO2 24 21 - 32 mmol/L    Anion gap 9 7 - 16 mmol/L    Glucose 109 (H) 65 - 100 mg/dL    BUN 44 (H) 8 - 23 MG/DL    Creatinine 1.71 (H) 0.8 - 1.5 MG/DL    GFR est AA 49 (L) >60 ml/min/1.73m2    GFR est non-AA 40 (L) >60 ml/min/1.73m2    Calcium 10.7 (H) 8.3 - 10.4 MG/DL    Bilirubin, total 0.4 0.2 - 1.1 MG/DL    ALT (SGPT) 25 12 - 65 U/L    AST (SGOT) 49 (H) 15 - 37 U/L    Alk. phosphatase 111 50 - 136 U/L    Protein, total 7.1 6.3 - 8.2 g/dL    Albumin 3.6 3.2 - 4.6 g/dL    Globulin 3.5 2.3 - 3.5 g/dL    A-G Ratio 1.0 (L) 1.2 - 3.5     CBC WITH AUTOMATED DIFF    Collection Time: 12/02/20  2:21 PM   Result Value Ref Range    WBC 9.9 4.3 - 11.1 K/uL    RBC 3.67 (L) 4.23 - 5.6 M/uL    HGB 11.4 (L) 13.6 - 17.2 g/dL    HCT 35.6 (L) 41.1 - 50.3 %    MCV 97.0 79.6 - 97.8 FL    MCH 31.1 26.1 - 32.9 PG    MCHC 32.0 31.4 - 35.0 g/dL    RDW 13.8 11.9 - 14.6 %    PLATELET 852 747 - 481 K/uL    MPV 9.3 (L) 9.4 - 12.3 FL    ABSOLUTE NRBC 0.00 0.0 - 0.2 K/uL    DF AUTOMATED      NEUTROPHILS 90 (H) 43 - 78 %    LYMPHOCYTES 4 (L) 13 - 44 %    MONOCYTES 5 4.0 - 12.0 %    EOSINOPHILS 1 0.5 - 7.8 %    BASOPHILS 0 0.0 - 2.0 %    IMMATURE GRANULOCYTES 1 0.0 - 5.0 %    ABS. NEUTROPHILS 8.9 (H) 1.7 - 8.2 K/UL    ABS. LYMPHOCYTES 0.4 (L) 0.5 - 4.6 K/UL    ABS. MONOCYTES 0.5 0.1 - 1.3 K/UL    ABS. EOSINOPHILS 0.1 0.0 - 0.8 K/UL    ABS. BASOPHILS 0.0 0.0 - 0.2 K/UL    ABS. IMM.  GRANS. 0.1 0.0 - 0.5 K/UL   MAGNESIUM    Collection Time: 12/02/20  2:21 PM   Result Value Ref Range    Magnesium 1.9 1.8 - 2.4 mg/dL   TSH 3RD GENERATION    Collection Time: 12/02/20  2:21 PM   Result Value Ref Range    TSH 1.130 uIU/mL   T4, FREE    Collection Time: 12/02/20  2:21 PM   Result Value Ref Range    T4, Free 1.3 0.78 - 1.46 NG/DL   LD Collection Time: 12/02/20  2:21 PM   Result Value Ref Range     (H) 110 - 210 U/L   TRANSFERRIN SATURATION    Collection Time: 12/02/20  2:21 PM   Result Value Ref Range    Iron 63 35 - 150 ug/dL    TIBC 321 250 - 450 ug/dL    Transferrin Saturation 20 %   FERRITIN    Collection Time: 12/02/20  2:21 PM   Result Value Ref Range    Ferritin 125 8 - 709 NG/ML   METABOLIC PANEL, BASIC    Collection Time: 12/03/20  5:23 AM   Result Value Ref Range    Sodium 139 136 - 145 mmol/L    Potassium 4.1 3.5 - 5.1 mmol/L    Chloride 107 98 - 107 mmol/L    CO2 26 21 - 32 mmol/L    Anion gap 6 (L) 7 - 16 mmol/L    Glucose 74 65 - 100 mg/dL    BUN 36 (H) 8 - 23 MG/DL    Creatinine 1.39 0.8 - 1.5 MG/DL    GFR est AA >60 >60 ml/min/1.73m2    GFR est non-AA 51 (L) >60 ml/min/1.73m2    Calcium 10.1 8.3 - 10.4 MG/DL   CBC W/O DIFF    Collection Time: 12/03/20  5:23 AM   Result Value Ref Range    WBC 6.8 4.3 - 11.1 K/uL    RBC 3.51 (L) 4.23 - 5.6 M/uL    HGB 10.7 (L) 13.6 - 17.2 g/dL    HCT 33.3 (L) 41.1 - 50.3 %    MCV 94.9 79.6 - 97.8 FL    MCH 30.5 26.1 - 32.9 PG    MCHC 32.1 31.4 - 35.0 g/dL    RDW 13.9 11.9 - 14.6 %    PLATELET 683 042 - 922 K/uL    MPV 9.5 9.4 - 12.3 FL    ABSOLUTE NRBC 0.00 0.0 - 0.2 K/uL       Assessment/ Plan:     Principal Problem:    Syncope (12/2/2020)    Active Problems:    Mixed hyperlipidemia (4/4/2013)      Weight loss (12/2/2020)      Cervical mass (12/2/2020)      Renal insufficiency (12/2/2020)    Syncope - Possibly orthostatic? Vasovagal since from bending to standing position? Ordered orthostatic BP. Placed on cardiac monitor. Since has not been eating well for 6 months, will give gentle hydration. Ordered UA to ensure no UTI affecting mentation.      Renal insufficiency - Mild, give gentle hydration.      Cervical mass with weight loss - Concern for lymphoma. Order CT with contrast to neck, chest, abdomen, pelvis. Depending on results, may consult oncology      Head trauma.  No bleeding on CT head     ASA/statin     Patient wishes to be DNR     DVT prophylaxis - SCDs  Signed By: Melvin Marie DO     December 3, 2020

## 2020-12-03 NOTE — PROGRESS NOTES
TRANSFER - IN REPORT:    Verbal report received from Jose Raul [address], RN(name) on [facility]  being received from ED(unit) for routine progression of care      Report consisted of patients Situation, Background, Assessment and   Recommendations(SBAR). Information from the following report(s) SBAR, Kardex, ED Summary and Recent Results was reviewed with the receiving nurse. Opportunity for questions and clarification was provided. Assessment completed upon patients arrival to unit and care assumed.

## 2020-12-04 LAB
ANION GAP SERPL CALC-SCNC: 8 MMOL/L (ref 7–16)
BUN SERPL-MCNC: 28 MG/DL (ref 8–23)
CALCIUM SERPL-MCNC: 10 MG/DL (ref 8.3–10.4)
CHLORIDE SERPL-SCNC: 106 MMOL/L (ref 98–107)
CO2 SERPL-SCNC: 26 MMOL/L (ref 21–32)
CREAT SERPL-MCNC: 1.34 MG/DL (ref 0.8–1.5)
GLUCOSE SERPL-MCNC: 82 MG/DL (ref 65–100)
POTASSIUM SERPL-SCNC: 4 MMOL/L (ref 3.5–5.1)
SODIUM SERPL-SCNC: 140 MMOL/L (ref 136–145)

## 2020-12-04 PROCEDURE — 99218 HC RM OBSERVATION: CPT

## 2020-12-04 PROCEDURE — 80048 BASIC METABOLIC PNL TOTAL CA: CPT

## 2020-12-04 PROCEDURE — 97165 OT EVAL LOW COMPLEX 30 MIN: CPT

## 2020-12-04 PROCEDURE — 65270000029 HC RM PRIVATE

## 2020-12-04 PROCEDURE — 74011000302 HC RX REV CODE- 302: Performed by: FAMILY MEDICINE

## 2020-12-04 PROCEDURE — 2709999900 HC NON-CHARGEABLE SUPPLY

## 2020-12-04 PROCEDURE — 86580 TB INTRADERMAL TEST: CPT | Performed by: FAMILY MEDICINE

## 2020-12-04 PROCEDURE — 74011250637 HC RX REV CODE- 250/637: Performed by: FAMILY MEDICINE

## 2020-12-04 PROCEDURE — 36415 COLL VENOUS BLD VENIPUNCTURE: CPT

## 2020-12-04 PROCEDURE — 99223 1ST HOSP IP/OBS HIGH 75: CPT | Performed by: INTERNAL MEDICINE

## 2020-12-04 PROCEDURE — 97161 PT EVAL LOW COMPLEX 20 MIN: CPT

## 2020-12-04 RX ADMIN — TUBERCULIN PURIFIED PROTEIN DERIVATIVE 5 UNITS: 5 INJECTION, SOLUTION INTRADERMAL at 08:37

## 2020-12-04 RX ADMIN — LORATADINE 10 MG: 10 TABLET ORAL at 08:37

## 2020-12-04 RX ADMIN — Medication 81 MG: at 08:37

## 2020-12-04 RX ADMIN — ATORVASTATIN CALCIUM 10 MG: 10 TABLET, FILM COATED ORAL at 21:33

## 2020-12-04 RX ADMIN — TRAMADOL HYDROCHLORIDE 50 MG: 50 TABLET, FILM COATED ORAL at 21:33

## 2020-12-04 RX ADMIN — TRAMADOL HYDROCHLORIDE 50 MG: 50 TABLET, FILM COATED ORAL at 01:37

## 2020-12-04 NOTE — PROGRESS NOTES
Problem: Mobility Impaired (Adult and Pediatric)  Goal: *Acute Goals and Plan of Care (Insert Text)  Description: STG:  (1.)Mr. Joyce Farrell will move from supine to sit and sit to supine  with CONTACT GUARD ASSIST within 4-7 treatment day(s). (2.)Mr. Joyce Farrell will transfer from bed to chair and chair to bed with CONTACT GUARD ASSIST using the least restrictive device within 4-7 treatment day(s). (3.)Mr. Joyce Farrell will ambulate with MINIMAL ASSIST for 100 feet with the least restrictive device within 4-7 treatment day(s). ________________________________________________________________________________________________      Outcome: Progressing Towards Goal     PHYSICAL THERAPY: Initial Assessment and PM 12/4/2020  INPATIENT: PT Visit Days : 1  Payor: [address] [place] [address] / Plan: [address] / Product Type: [employer] /       NAME/AGE/GENDER: Aaron Hudson is a 80 y.o. male   PRIMARY DIAGNOSIS: Syncope [R55]  Weight loss [R63.4]  Cervical mass [N88.8] Syncope Syncope        ICD-10: Treatment Diagnosis:    · Generalized Muscle Weakness (M62.81)  · Difficulty in walking, Not elsewhere classified (R26.2)   Precaution/Allergies:  Patient has no known allergies. ASSESSMENT:     Mr. Joyce Farrell presents with generalized weakness and decreased independence with functional mobility. He had a fall at home in his garage and was admitted with above diagnosis, while in ED CT cervical spine showed multiple enlarged lymph nodes within the right neck one  of which measures greater than 3 cm in size. A partially imaged mass within the  left infraclavicular region measures at least 4 cm in size. The findings would be concerning for metastatic disease or lymphoma. He lives at home with his wife where he states he was independent with mobility without assistive devices. Wears bilateral hearing aids.  He will benefit from skilled PT interventions to maximize independence with functional mobility as he is well below his baseline for mobility and would not be safe to be up without assist. He walked around room with RW and minimal assist and wanted to get back in the bed. In supine was asking for help to llift his head of the pillow. Hope to progress at next therapy session. This section established at most recent assessment   PROBLEM LIST (Impairments causing functional limitations):  1. Decreased Strength  2. Decreased ADL/Functional Activities  3. Decreased Transfer Abilities  4. Decreased Ambulation Ability/Technique  5. Decreased Balance  6. Decreased Activity Tolerance   INTERVENTIONS PLANNED: (Benefits and precautions of physical therapy have been discussed with the patient.)  1. Balance Exercise  2. Bed Mobility  3. Gait Training  4. Therapeutic Activites  5. Therapeutic Exercise/Strengthening  6. Transfer Training     TREATMENT PLAN: Frequency/Duration: daily for duration of hospital stay  Rehabilitation Potential For Stated Goals: Good     REHAB RECOMMENDATIONS (at time of discharge pending progress):    Placement: It is my opinion, based on this patient's performance to date, that Mr. Petty Ledezma may benefit from intensive therapy at a [facility] after discharge due to the functional deficits listed above that are likely to improve with skilled rehabilitation and concerns that he/she may be unsafe to be unsupervised at home. Equipment:    To be determined, will likely need a walker              HISTORY:   History of Present Injury/Illness (Reason for Referral):  PER MD NOTE:Patient is a 80 y.o. male who presented to the ED for cc fall after syncopal event in [facility]. Event was not witnessed. He states he was climbing his stairs and bent down to change his shoes. When standing back up he became dizzy and hit head. Hx of HLD, chronic pain. Also admits to 50 pound weight loss in last 6 months. Does not recall family hx of cancer. Vitals - /86     Labs- Hg 11.4.       CT cervical spine showed multiple enlarged lymph nodes within the right neck one  of which measures greater than 3 cm in size. A partially imaged mass within the  left infraclavicular region measures at least 4 cm in size. The findings would be concerning for metastatic disease or lymphoma. Past Medical History/Comorbidities:   Mr. Breann Hines  has a past medical history of Mixed hyperlipidemia (4/4/2013) and Neuralgia (4/4/2013). Mr. Breann Hines  has a past surgical history that includes hx cataract removal.  Social History/Living Environment:   Home Environment: Private residence  Living Alone: No  Support Systems: Spouse/Significant Other/Partner  Tub or Shower Type: Shower  Prior Level of Function/Work/Activity:  Pt living at home with spouse, was independent with mobility without assistive devices     Number of Personal Factors/Comorbidities that affect the Plan of Care: 1-2: MODERATE COMPLEXITY   EXAMINATION:   Most Recent Physical Functioning:   Gross Assessment:  AROM: Generally decreased, functional  Strength: Generally decreased, functional               Posture:  Posture (WDL): Exceptions to WDL  Posture Assessment: Forward head, Rounded shoulders  Balance:  Sitting: Intact  Standing: Pull to stand; With support Bed Mobility:  Supine to Sit: Minimum assistance(with head of bed elevated and bed rail)  Sit to Supine: Minimum assistance  Wheelchair Mobility:     Transfers:  Sit to Stand: Minimum assistance  Stand to Sit: Minimum assistance  Gait:     Speed/Delmy: Pace decreased (<100 feet/min); Shuffled  Step Length: Left shortened;Right shortened  Gait Abnormalities: Decreased step clearance;Shuffling gait  Distance (ft): 15 Feet (ft)  Assistive Device: Walker, rolling  Ambulation - Level of Assistance: Minimal assistance      Body Structures Involved:  1. Muscles Body Functions Affected:  1. Movement Related Activities and Participation Affected:  1. Mobility  2.  Self Care   Number of elements that affect the Plan of Care: 4+: HIGH COMPLEXITY   CLINICAL PRESENTATION:   Presentation: Stable and uncomplicated: LOW COMPLEXITY   CLINICAL DECISION MAKIN[address] [place] [id number] AM-PAC 6 Clicks   Basic Mobility Inpatient Short Form  How much difficulty does the patient currently have. .. Unable A Lot A Little None   1. Turning over in bed (including adjusting bedclothes, sheets and blankets)? [] 1   [] 2   [x] 3   [] 4   2. Sitting down on and standing up from a chair with arms ( e.g., wheelchair, bedside commode, etc.)   [] 1   [] 2   [x] 3   [] 4   3. Moving from lying on back to sitting on the side of the bed? [] 1   [] 2   [x] 3   [] 4   How much help from another person does the patient currently need. .. Total A Lot A Little None   4. Moving to and from a bed to a chair (including a wheelchair)? [] 1   [] 2   [x] 3   [] 4   5. Need to walk in hospital room? [] 1   [] 2   [x] 3   [] 4   6. Climbing 3-5 steps with a railing? [] 1   [] 2   [x] 3   [] 4   © , Trustees of [address] [place] [address], under license to [employer]. All rights reserved      Score:  Initial: 18 Most Recent: X (Date: -- )    Interpretation of Tool:  Represents activities that are increasingly more difficult (i.e. Bed mobility, Transfers, Gait). Medical Necessity:     · Patient is expected to demonstrate progress in   · strength and functional technique  ·  to   · decrease assistance required with functional mobility and strengthening  · .  Reason for Services/Other Comments:  · Patient continues to require skilled intervention due to   · Inability to complete functional mobility and strengthening independently  · .    Use of outcome tool(s) and clinical judgement create a POC that gives a: Clear prediction of patient's progress: LOW COMPLEXITY            TREATMENT:   (In addition to Assessment/Re-Assessment sessions the following treatments were rendered)   Pre-treatment Symptoms/Complaints:  pt weak but did all right  Pain: Initial: no reports of pain     Post Session:  no reports of pain     Assessment/Reassessment only, no treatment provided today    Braces/Orthotics/Lines/Etc:   · O2 Device: Room air  Treatment/Session Assessment:    · Response to Treatment:  pt tolerated fair, weak  · Interdisciplinary Collaboration:   o Occupational Therapist  o Registered Nurse  · After treatment position/precautions:   o Supine in bed  o Bed/Chair-wheels locked  o Bed in low position  o Call light within reach   · Compliance with Program/Exercises: Will assess as treatment progresses  · Recommendations/Intent for next treatment session: \"Next visit will focus on advancements to more challenging activities and reduction in assistance provided\".   Total Treatment Duration:  PT Patient Time In/Time Out  Time In: 1310  Time Out: Joyce 1036, PT

## 2020-12-04 NOTE — CONSULTS
H&P/Consult Note/Progress Note/Office Note:   Zachary Santillan  MRN: [id number]  [facility]:[birth year]  Age:88 y.o. General Surgery Consult ordered by: Blanka Thomas NP  Reason for General Surgery Consult: Surgical Biopsy of neck mass    HPI: Zachary Santillan is a 80 y.o. male with a past medical history of Hyperlipidemia and chronic pain who presented to the ED 12/2/20 due to fall after syncopal event in garage. He states he was climbing his stairs and bent down to change his shoes. When standing back up he became dizzy and hit his head. Pt also admits to 50 pound weight loss in last 6 months. CT cervical spine showed multiple enlarged lymph nodes within the right neck one of which measures greater than 3 cm in size. A partially imaged mass within the left infraclavicular region measures at least 4 cm in size. The findings would be concerning for metastatic disease or lymphoma. Patient was admitted for syncope along with further work up for possible metastatic disease. Past Medical History:   Diagnosis Date    Mixed hyperlipidemia 4/4/2013    Neuralgia 4/4/2013     Past Surgical History:   Procedure Laterality Date    HX CATARACT REMOVAL       Current Facility-Administered Medications   Medication Dose Route Frequency    tuberculin injection 5 Units  5 Units IntraDERMal ONCE    acetaminophen (TYLENOL) tablet 650 mg  650 mg Oral Q6H PRN    Or    acetaminophen (TYLENOL) suppository 650 mg  650 mg Rectal Q6H PRN    polyethylene glycol (MIRALAX) packet 17 g  17 g Oral DAILY PRN    ondansetron (ZOFRAN) injection 4 mg  4 mg IntraVENous Q6H PRN    aspirin delayed-release tablet 81 mg  81 mg Oral DAILY    atorvastatin (LIPITOR) tablet 10 mg  10 mg Oral QHS    traMADoL (ULTRAM) tablet 50 mg  50 mg Oral Q6H PRN    loratadine (CLARITIN) tablet 10 mg  10 mg Oral DAILY     Patient has no known allergies.   Social History     Socioeconomic History    Marital status:      Spouse name: Not on file    Number of children: Not on file    Years of education: Not on file    Highest education level: Not on file   Tobacco Use    Smoking status: Never Smoker    Smokeless tobacco: Never Used   Substance and Sexual Activity    Alcohol use: No    Drug use: No     Social History     Tobacco Use   Smoking Status Never Smoker   Smokeless Tobacco Never Used     No family history on file. ROS: Comprehensive review of systems was otherwise unremarkable except as noted above. Physical Exam:   Visit Vitals  /80 (BP 1 Location: Left arm, BP Patient Position: Supine)   Pulse 93   Temp 98.2 °F (36.8 °C)   Resp 18   Ht 6' 1\" (1.854 m)   Wt 157 lb (71.2 kg)   SpO2 96%   BMI 20.71 kg/m²     Vitals:    12/04/20 0255 12/04/20 0747 12/04/20 1112 12/04/20 1510   BP: 135/72 (!) 141/73 (!) 144/75 121/80   Pulse: 90 98 87 93   Resp: 18 18 18 18   Temp: 98.1 °F (36.7 °C) 97.8 °F (36.6 °C) 97.8 °F (36.6 °C) 98.2 °F (36.8 °C)   SpO2: 97% 98% 98% 96%   Weight:       Height:         12/04 0701 - 12/04 1900  In: 480 [P.O.:480]  Out: -   12/02 1901 - 12/04 0700  In: 3068 [P.O.:720; I.V.:837]  Out: 925 [Urine:925]    Constitutional: Alert, oriented, cooperative patient in no acute distress; appears stated age    Eyes: Sclera are clear. EOMs intact  ENMT: no external lesions, [place]; no obvious neck masses, no ear or lip lesions, nares normal  Neck: lymphadenopathy right greater than left  Chest: enlargement of the left axillary and anterior chest wall region which is firm. This is been present for approximately 1 year. CV: RRR. Normal perfusion  Resp: No JVD. Breathing is  non-labored; no audible wheezing. GI: soft and non-distended     Musculoskeletal: No embolic signs or cyanosis.    Neuro:  Oriented; moves all 4; no focal deficits  Psychiatric: normal affect and mood, no memory impairment    Recent vitals (if inpt):  Patient Vitals for the past 24 hrs:   BP Temp Pulse Resp SpO2   12/04/20 1510 121/80 98.2 °F (36.8 °C) 93 18 96 % 12/04/20 1112 (!) 144/75 97.8 °F (36.6 °C) 87 18 98 %   12/04/20 0747 (!) 141/73 97.8 °F (36.6 °C) 98 18 98 %   12/04/20 0255 135/72 98.1 °F (36.7 °C) 90 18 97 %   12/03/20 2303 (!) 126/56 98.4 °F (36.9 °C) 96 18 93 %   12/03/20 1951 (!) 155/83 97.9 °F (36.6 °C) 91 18 95 %       Labs:  Recent Labs     12/04/20  0535 12/03/20  0523 12/02/20  1421   WBC  --  6.8 9.9   HGB  --  10.7* 11.4*   PLT  --  225 216    139 138   K 4.0 4.1 4.6    107 105   CO2 26 26 24   BUN 28* 36* 44*   CREA 1.34 1.39 1.71*   GLU 82 74 109*   TBILI  --   --  0.4   ALT  --   --  25   AP  --   --  111       Lab Results   Component Value Date/Time    WBC 6.8 12/03/2020 05:23 AM    HGB 10.7 (L) 12/03/2020 05:23 AM    PLATELET 856 37/18/1068 05:23 AM    Sodium 140 12/04/2020 05:35 AM    Potassium 4.0 12/04/2020 05:35 AM    Chloride 106 12/04/2020 05:35 AM    CO2 26 12/04/2020 05:35 AM    BUN 28 (H) 12/04/2020 05:35 AM    Creatinine 1.34 12/04/2020 05:35 AM    Glucose 82 12/04/2020 05:35 AM    Bilirubin, total 0.4 12/02/2020 02:21 PM    ALT (SGPT) 25 12/02/2020 02:21 PM    Alk. phosphatase 111 12/02/2020 02:21 PM       CT Results  (Last 48 hours)               12/03/20 1401  CT NECK CHEST ABD PELV W CONT Final result    Impression:  IMPRESSION:   1. Extensive right neck adenopathy, right axillary adenopathy with marked   abnormal soft tissue mass centered at the left axilla and left subpectoral   space. Consultation of findings suggest lymphoma. 2. No discrete adenopathy below the diaphragm. 3. There are at least 2 hypoenhancing lesions within the right hepatic lobe   which may represent additional foci of disease although remain indeterminate. 4. Cardiac enlargement, small left pleural effusion, extensive atherosclerotic   disease with infrarenal 3.1 cm AAA. 5. There is colonic diverticulosis without CT evidence of acute diverticulosis.        Narrative:  CT of the neck, chest, abdomen, and pelvis with contrast 12/3/2020 Comparison: CT cervical spine 12/02/2020. Indication: Lymphadenopathy and anterior chest mass seen on recent CT scan of   the neck. Technique:  CT imaging was performed of the neck, chest, abdomen, and pelvis   following the uncomplicated administration of intravenous contrast (Isovue 370,   100 mL). Oral contrast was used for bowel opacification. Intravenous contrast   was used for better evaluation of solid organs and vascular structures. Radiation dose reduction techniques were used for this study:  Our CT scanners   use one or all of the following: Automated exposure control, adjustment of the   mA and/or kVp according to patient's size, iterative reconstruction. Findings:   NECK CT:   Globes and orbits intact. Limited views of intracranial contents within normal   limits. The oropharynx, nasopharynx, larynx normal. Scattered atherosclerotic   disease identified. Acute on chronic right maxillary sinusitis. Extensive right   jugulodigastric lymphadenopathy some of which have small foci internal necrosis. Largest node posterior to the right mandibular angle measures up to 3.1 cm. Vessels patent. CHEST CT:    Large soft tissue mass left axilla extending into the subpectoral space   measuring up to approximately 17.1 x 15.1 x 9.1 cm. Adjacent inflammatory   stranding. This mass encases although does not compress left axillary vessels. There appears to be extension to the anterior lateral left subpleural space but   this process. Right axillary adenopathy also present measuring up 1.4 cm short   axis. Mild cardiac enlargement with scattered coronary artery calcification. Large hiatal hernia. Small left pleural effusion. No hilar adenopathy. No   pericardial effusion. The lung parenchyma is unremarkable. No lung mass seen. ABDOMEN CT:   There are at least 2 hypoattenuating foci within the right hepatic lobe with   largest measuring 1.9 cm series 3 image 43.  Portal and hepatic veins are patent. Status post cholecystectomy. No discrete enhancing renal mass. No hydronephrosis. Solitary bilateral simple   renal cysts. The spleen, adrenal glands, pancreas unremarkable. There is no   intra or extrahepatic biliary ductal dilatation. PELVIS CT:   The bowel is normal in caliber, and there is no focal or diffuse bowel wall   thickening. Atherosclerotic disease of the aorta with infrarenal AAA measuring   up to 3.1 cm. Large periampullary duodenal diverticulum. Extensive sigmoid   diverticulosis. No CT evidence of acute diverticulitis. There is no free air or free fluid in the abdomen or pelvis. There is no significant retroperitoneal, pelvic, mesenteric, or inguinal   lymphadenopathy. The bladder is unremarkable. There are no aggressive appearing osseous lesions. chest X-ray      I reviewed recent labs, recent radiologic studies, and pertinent records including other doctor notes if needed. I independently reviewed radiology images for studies I described above or studies I have ordered.    Admission date (for inpatients): 12/2/2020   * No surgery found *  * No surgery found *    ASSESSMENT/PLAN:  Problem List  Date Reviewed: 7/9/2020          Codes Class Noted    * (Principal) Syncope ICD-10-CM: R55  ICD-9-CM: 780.2  12/2/2020        Weight loss ICD-10-CM: R63.4  ICD-9-CM: 783.21  12/2/2020        Cervical mass ICD-10-CM: N88.8  ICD-9-CM: 622.8  12/2/2020        Renal insufficiency ICD-10-CM: N28.9  ICD-9-CM: 593.9  12/2/2020        Mixed hyperlipidemia ICD-10-CM: E78.2  ICD-9-CM: 272.2  4/4/2013        Neuralgia ICD-10-CM: M79.2  ICD-9-CM: 729.2  4/4/2013            Principal Problem:    Syncope (12/2/2020)    Active Problems:    Mixed hyperlipidemia (4/4/2013)      Weight loss (12/2/2020)      Cervical mass (12/2/2020)      Renal insufficiency (12/2/2020)         Plan:  Care management per Hospitalist  General Surgery will follow - Cervical mass with weight loss along with lesions in liver - Concern for lymphoma.    Planning bedside biopsy tomorrow by Dr. Mateus Faith, NP

## 2020-12-04 NOTE — PROGRESS NOTES
Had a very restless night. Unable to get comfortable. Needs something to help him sleep and something more for pain. Not much relief from ultram. Please review.

## 2020-12-04 NOTE — CONSULTS
[facility]        Inpatient Hematology / Oncology Consult Note    Reason for Consult:  Syncope [R55]  Weight loss [R63.4]  Cervical mass [N88.8]  Referring Physician:  Tracy Avilez DO    History of Present Illness:  Mr. Toya Smiley is a 80 y.o. male admitted on 12/2/2020. The primary encounter diagnosis was Syncope and collapse. A diagnosis of Blunt head trauma, initial encounter was also pertinent to this visit. Mr Toya Smiley has a PMH of hyperlipidemia. He reports he is fairly active at home and lives with his wife independently. Mr Toya Smiley reports he has had ongoing left neck/chest wall swelling/mass for approximately 1 year that has continued to increase in size and is associated with left arm pain resolved with massage. He also reports right sided neck swelling for a similar amount of time but notes that this area has improved in size. He reported this to his PCP and an US was ordered but it does not appear that this was completed. Mr Toya Smiley presented to the ED on 12/2/2020 with syncopal episode that resulted in him hitting his head. CT C-spine showed concerning adenopathy. . CT CNAP showed extensive right neck adenopathy and right axillary adenopathy. Imaging also showed abnormal soft tissue mass centered at left axilla and left subpectoral space. No adenopathy below diaphragm. 2 hyperenhancing lesions within right hepatic lobe - indeterminate. Also noted cardiac enlargement and small left pleural effusion, extensive atherosclerotic disease with infrarenal 3.1 cm AAA. Carotid doppler without stenosis. 2D echo shows EF 65-70%, likely mild aortic stenosis. We have been consulted for recommendations for this patient. Review of Systems:  Constitutional +weight loss. Denies fever, chills, appetite changes, fatigue, night sweats. HEENT +difficulty hearing. Denies trauma, blurry vision, ear pain, nosebleeds, sore throat, neck pain.     Skin +Left upper chest/neck mass and right neck swelling/mass x ~1 year. Denies rashes. Lungs Denies dyspnea, cough, sputum production or hemoptysis. Cardiovascular Denies chest pain, palpitations, or lower extremity edema. Gastrointestinal Denies nausea, vomiting, changes in bowel habits, bloody or black stools, abdominal pain.  Denies dysuria, frequency or hesitancy of urination. Neuro Denies headaches, visual changes or ataxia. Denies dizziness, tingling, tremors, sensory change, speech change, focal weakness or headaches. Hematology Denies easy bruising or bleeding, denies gingival bleeding or epistaxis. Endo Denies heat/cold intolerance, denies diabetes or thyroid abnormalities. MSK +LUE pain. Denies back pain, arthralgias, myalgias or frequent falls. Psychiatric/Behavioral Denies depression and substance abuse. The patient is not nervous/anxious. No Known Allergies  Past Medical History:   Diagnosis Date    Mixed hyperlipidemia 4/4/2013    Neuralgia 4/4/2013     Past Surgical History:   Procedure Laterality Date    HX CATARACT REMOVAL       No family history on file.   Social History     Socioeconomic History    Marital status:      Spouse name: Not on file    Number of children: Not on file    Years of education: Not on file    Highest education level: Not on file   Occupational History    Not on file   Social Needs    Financial resource strain: Not on file    Food insecurity     Worry: Not on file     Inability: Not on file    Transportation needs     Medical: Not on file     Non-medical: Not on file   Tobacco Use    Smoking status: Never Smoker    Smokeless tobacco: Never Used   Substance and Sexual Activity    Alcohol use: No    Drug use: No    Sexual activity: Not on file   Lifestyle    Physical activity     Days per week: Not on file     Minutes per session: Not on file    Stress: Not on file   Relationships    Social connections     Talks on phone: Not on file     Gets together: Not on file     Attends [employer] service: Not on file     Active member of club or organization: Not on file     Attends meetings of clubs or organizations: Not on file     Relationship status: Not on file    Intimate partner violence     Fear of current or ex partner: Not on file     Emotionally abused: Not on file     Physically abused: Not on file     Forced sexual activity: Not on file   Other Topics Concern    Not on file   Social History Narrative    Not on file     Current Facility-Administered Medications   Medication Dose Route Frequency Provider Last Rate Last Dose    tuberculin injection 5 Units  5 Units IntraDERMal Guillermo Roman, DO   5 Units at 20 1458    acetaminophen (TYLENOL) tablet 650 mg  650 mg Oral Q6H PRN [facility], DO        Or    acetaminophen (TYLENOL) suppository 650 mg  650 mg Rectal Q6H PRN [facility], DO        polyethylene glycol (MIRALAX) packet 17 g  17 g Oral DAILY PRN [facility], DO        ondansetron [facility]) injection 4 mg  4 mg IntraVENous Q6H PRN [facility], DO        aspirin delayed-release tablet 81 mg  81 mg Oral DAILY [facility], DO   81 mg at 20 4062    atorvastatin (LIPITOR) tablet 10 mg  10 mg Oral QHS [facility], DO   10 mg at 20 2142    traMADoL (ULTRAM) tablet 50 mg  50 mg Oral Q6H PRN [facility], DO   50 mg at 20 5766    0.9% sodium chloride infusion  50 mL/hr IntraVENous CONTINUOUS [facility], DO 50 mL/hr at 20 1222 50 mL/hr at 20 1222    loratadine (CLARITIN) tablet 10 mg  10 mg Oral DAILY [facility], DO   10 mg at 20 2802       OBJECTIVE:  Patient Vitals for the past 8 hrs:   BP Temp Pulse Resp SpO2   20 0747 (!) 141/73 97.8 °F (36.6 °C) 98 18 98 %     Temp (24hrs), Av.9 °F (36.6 °C), Min:97.3 °F (36.3 °C), Max:98.4 °F (36.9 °C)     07 -  1900  In: 240 [P.O.:240]  Out: -     Physical Exam:  Constitutional: Well developed, well nourished elderly male in no acute distress, sitting comfortably in the hospital bed. HEENT: Normocephalic and atraumatic. Oropharynx is clear, mucous membranes are moist. Extraocular muscles are intact. Sclerae anicteric. Neck supple without JVD. No thyromegaly present. Lymph node   +palpable mass on left chest wall, supraclavicular area. +right sided adenopathy on right neck. Skin Warm and dry. No bruising and no rash noted. No erythema. No pallor. Respiratory Lungs are clear to auscultation bilaterally without wheezes, rales or rhonchi, normal air exchange without accessory muscle use. CVS Normal rate, regular rhythm and normal S1 and S2. No murmurs, gallops, or rubs. Abdomen Soft, nontender and nondistended, normoactive bowel sounds. No palpable mass. No hepatosplenomegaly. Neuro Grossly nonfocal with no obvious sensory or motor deficits. MSK Normal range of motion in general.  No edema and no tenderness. Psych Appropriate mood and affect.         Labs:    Recent Results (from the past 24 hour(s))   METABOLIC PANEL, BASIC    Collection Time: 12/04/20  5:35 AM   Result Value Ref Range    Sodium 140 136 - 145 mmol/L    Potassium 4.0 3.5 - 5.1 mmol/L    Chloride 106 98 - 107 mmol/L    CO2 26 21 - 32 mmol/L    Anion gap 8 7 - 16 mmol/L    Glucose 82 65 - 100 mg/dL    BUN 28 (H) 8 - 23 MG/DL    Creatinine 1.34 0.8 - 1.5 MG/DL    GFR est AA >60 >60 ml/min/1.73m2    GFR est non-AA 53 (L) >60 ml/min/1.73m2    Calcium 10.0 8.3 - 10.4 MG/DL       Imaging:  [unfilled]    ASSESSMENT:  Problem List  Date Reviewed: 7/9/2020          Codes Class Noted    * (Principal) Syncope ICD-10-CM: R55  ICD-9-CM: 780.2  12/2/2020        Weight loss ICD-10-CM: R63.4  ICD-9-CM: 783.21  12/2/2020        Cervical mass ICD-10-CM: N88.8  ICD-9-CM: 622.8  12/2/2020        Renal insufficiency ICD-10-CM: N28.9  ICD-9-CM: 593.9  12/2/2020        Mixed hyperlipidemia ICD-10-CM: E78.2  ICD-9-CM: 272.2 4/4/2013        Neuralgia ICD-10-CM: M79.2  ICD-9-CM: 729.2  4/4/2013                RECOMMENDATIONS:    Right neck and axillary adenopathy, extensive left-sided soft tissue mass  -   - Consult surgery for surgical biopsy of left-sided mass    Atherosclerotic disease / AAA  - 2D echo with aortic stenosis, preserved EF  - Carotid doppler unremarkable    Lab studies and imaging studies  were personally reviewed. Pertinent old records were reviewed. Thank you for allowing us to participate in the care of Mr. Pawel Aranda. We will continue to follow along but will likely have nothing to add until pathology results. Selvin ChengJoyce [facility]  [address] [address]  Office : [phone]  Fax : [phone]       Attending Addendum:  I have personally performed a face to face diagnostic evaluation on this patient. I have reviewed and agree with the care plan as documented above by  Toni Escobar N.P.  My findings are as follows: Patient appears stable, heart rate regular without murmurs, abdomen is non-tender, bowel sounds are positive. Elderly male patient, reports independent in his ADLs, history of dyslipidemia, reports progressively worsening right neck and left chest wall/armpit swelling over the last year or so. Now admitted through ED with syncopal episode with imaging concerning for adenopathy. LDH elevated at 750. CT NCAP with extensive neck and axillary adenopathy, as well as soft tissue mass centered around left axilla/subpectoral region measuring over 17 x 15 cm.  2 liver lesions also noted. Neck adenopathy as well as chest wall masses easily palpable. Will have surgery evaluate for biopsy. Further course based on results. We will also get new baseline 2D echo in anticipation of possible therapies later. Thank you for allowing us to participate in care of this pleasant patient. Please call w any questions.       [email or web address] Magda Hair, [address] [facility]  [address] [address]  Office : [phone]  Fax : [phone]

## 2020-12-04 NOTE — PROGRESS NOTES
Progress Note    Patient: Juan Anderson MRN: [id number]  SSN: [id number]    YOB: 1932  Age: 80 y.o. Sex: male      Admit Date: 12/2/2020    LOS: 2 days     Subjective:   F/U syncope, neck masses    80 y.o. male who presented to the ED for cc fall after syncopal event in garage. Event was not witnessed. He states he was climbing his stairs and bent down to change his shoes. When standing back up he became dizzy and hit head. Hx of HLD, chronic pain. Also admits to 50 pound weight loss in last 6 months. Does not recall family hx of cancer. Hg 11.4. CT cervical spine showed multiple enlarged lymph nodes within the right neck one of which measures greater than 3 cm in size. A partially imaged mass within the left infraclavicular region measures at least 4 cm in size. The findings would be concerning for metastatic disease or lymphoma. Patient was admitted for syncope along with further work up for possible metastatic disease. . Iron studies normal. TSH normal. US carotids with no significant stenosis. ECHO with no significant structural abnormality. CT neck, chest, abdomen and pelvis with contrast showed extensive right neck adenopathy, right axillary adenopathy with marked abnormal soft tissue mass centered at the left axilla and left subpectoral space. Consultation of findings suggest lymphoma. There are at least 2 hypoenhancing lesions within the right hepatic lobe which may represent additional foci of disease although remain indeterminate. 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 Oncology consulted for further investigation. Surgery has been consulted for biopsy. Feeling better today. No chest pain or SOB.    Current Facility-Administered Medications   Medication Dose Route Frequency    tuberculin injection 5 Units  5 Units IntraDERMal ONCE    acetaminophen (TYLENOL) tablet 650 mg  650 mg Oral Q6H PRN    Or  acetaminophen (TYLENOL) suppository 650 mg  650 mg Rectal Q6H PRN    polyethylene glycol (MIRALAX) packet 17 g  17 g Oral DAILY PRN    ondansetron (ZOFRAN) injection 4 mg  4 mg IntraVENous Q6H PRN    aspirin delayed-release tablet 81 mg  81 mg Oral DAILY    atorvastatin (LIPITOR) tablet 10 mg  10 mg Oral QHS    traMADoL (ULTRAM) tablet 50 mg  50 mg Oral Q6H PRN    0.9% sodium chloride infusion  50 mL/hr IntraVENous CONTINUOUS    loratadine (CLARITIN) tablet 10 mg  10 mg Oral DAILY       Objective:     Vitals:    12/03/20 1951 12/03/20 2303 12/04/20 0255 12/04/20 0747   BP: (!) 155/83 (!) 126/56 135/72 (!) 141/73   Pulse: 91 96 90 98   Resp: 18 18 18 18   Temp: 97.9 °F (36.6 °C) 98.4 °F (36.9 °C) 98.1 °F (36.7 °C) 97.8 °F (36.6 °C)   SpO2: 95% 93% 97% 98%   Weight:       Height:             Intake and Output:  Current Shift: 12/04 0701 - 12/04 1900  In: 240 [P.O.:240]  Out: -   Last three shifts: 12/02 1901 - 12/04 0700  In: 7444 [P.O.:720; I.V.:837]  Out: 925 [Urine:925]    Physical Exam:   General:  Alert, cooperative, no distress, hard of hearing. Superficial skin tear to posterior head. Frail appearing   Eyes:  Conjunctivae/corneas clear. Ears:  Normal TMs and external ear canals both ears. Nose: Nares normal. Septum midline. Mouth/Throat: Lips, mucosa, and tongue normal.   Neck: Right lateral and anterior neck mass   Back:   deferred   Lungs:   Clear to auscultation bilaterally. Heart:  Regular rate and rhythm, S1, S2 normal   Abdomen:   Soft, non-tender. Bowel sounds normal.    Extremities: Muscle wasting. Cachetic    Pulses: 2+ and symmetric all extremities. Skin: Superficial skin tears to arms and posterior head    Lymph nodes: Cervical, supraclavicular, and axillary nodes normal.   Neurologic: CNII-XII intact. Good movement in bed.         Lab/Data Review:    Recent Results (from the past 24 hour(s))   METABOLIC PANEL, BASIC    Collection Time: 12/04/20  5:35 AM   Result Value Ref Range Sodium 140 136 - 145 mmol/L    Potassium 4.0 3.5 - 5.1 mmol/L    Chloride 106 98 - 107 mmol/L    CO2 26 21 - 32 mmol/L    Anion gap 8 7 - 16 mmol/L    Glucose 82 65 - 100 mg/dL    BUN 28 (H) 8 - 23 MG/DL    Creatinine 1.34 0.8 - 1.5 MG/DL    GFR est AA >60 >60 ml/min/1.73m2    GFR est non-AA 53 (L) >60 ml/min/1.73m2    Calcium 10.0 8.3 - 10.4 MG/DL       Assessment/ Plan:     Principal Problem:    Syncope (12/2/2020)    Active Problems:    Mixed hyperlipidemia (4/4/2013)      Weight loss (12/2/2020)      Cervical mass (12/2/2020)      Renal insufficiency (12/2/2020)    Syncope - Orthostatic BP negative. Vasovagal since from bending to standing position? Cardiac work up benign.      Renal insufficiency - back to baseline     Cervical mass with weight loss along with lesions in liver - Concern for lymphoma. General surgery consulted for possible biopsy. Patient wanting to know etiology of masses.      Head trauma. No bleeding on CT head     ASA/statin    Consult PT/OT     Patient wishes to be DNR    Hopeful dc in 1-2 days depending on when biopsy can be done.  Likely can follow up with oncology as outpatient.      DVT prophylaxis - SCDs  Signed By: Gerson Renteria DO     December 4, 2020

## 2020-12-04 NOTE — PROGRESS NOTES
Problem: Self Care Deficits Care Plan (Adult)  Goal: *Acute Goals and Plan of Care (Insert Text)  Outcome: Progressing Towards Goal  Note:  1. Patient will perform grooming with supervision. 2. Patient will perform Upper body dressing with supervision  3. Patient will perform lower body dressing with min assist.  4. Patient will perform upper and lower body bathing with min assist.  5. Patient will perform toilet transfers with CGA/min assist.  6. Patient will perform shower transfer with min assist.  7. Patient will participate in 30 + minutes of ADL/ therapeutic exercise/therapeutic activity with min rest breaks to increase activity tolerance for self care. 8. Patient will perform ADL functional mobility in room with CGA. Goals to be achieved in 7 days. OCCUPATIONAL THERAPY: Initial Assessment and PM 12/4/2020  INPATIENT: OT Visit Days: 1  Payor: [address] [place] [address] / Plan: [address] / Product Type: [employer] /      NAME/AGE/GENDER: Cadence Vazquez is a 80 y.o. male   PRIMARY DIAGNOSIS:  Syncope [R55]  Weight loss [R63.4]  Cervical mass [N88.8] Syncope Syncope        ICD-10: Treatment Diagnosis:    · Generalized Muscle Weakness (M62.81)  · Other lack of cordination (R27.8)  · Difficulty in walking, Not elsewhere classified (R26.2)  · Other abnormalities of gait and mobility (R26.89)  · History of falling (Z91.81)   Precautions/Allergies:     Patient has no known allergies. ASSESSMENT:     Mr. Sandra Mccarty presents supine in bed with above diagnosis. He fell at home and hit his head, he was bending down to put his shoes on and felt dizzy. He reported he always feels dizzy. He has a mass on his right neck and Large mass on he Left pec area of his chest. He was min assist supine to sit. He stood with min assist and ambulated around the bed with RW. He is Has some limitation in B shoulder flexion. He is mod assist with distal LE ADL items. He is min assist for UE ADLS. He is very hard of hearing. He did not want to get up to the chair, he wanted to return to the bed. He was min assist sit to supine. He reported he lives in a big house with his wife, 4 bathroom has a walk in shower. He was unsteady on his feet. He would benefit form skilled OT. Will follow. Bed alarm on. This section established at most recent assessment   PROBLEM LIST (Impairments causing functional limitations):  1. Decreased Strength  2. Decreased ADL/Functional Activities  3. Decreased Transfer Abilities  4. Decreased Ambulation Ability/Technique  5. Decreased Balance  6. Decreased Flexibility/Joint Mobility   INTERVENTIONS PLANNED: (Benefits and precautions of occupational therapy have been discussed with the patient.)  1. Activities of daily living training  2. Adaptive equipment training  3. Balance training  4. Clothing management  5. Donning&doffing training  6. Neuromuscular re-eduation  7. Therapeutic activity  8. Therapeutic exercise     TREATMENT PLAN: Frequency/Duration: Follow patient 1-2 times to address above goals. Rehabilitation Potential For Stated Goals: Good     REHAB RECOMMENDATIONS (at time of discharge pending progress):    Placement: It is my opinion, based on this patient's performance to date, that Mr. Tara Sawant may benefit from participating in 1-2 additional therapy sessions in order to continue to assess for rehab potential and then make recommendation for disposition at discharge. Equipment:    None at this time              OCCUPATIONAL PROFILE AND HISTORY:   History of Present Injury/Illness (Reason for Referral):  See H and P  Past Medical History/Comorbidities:   Mr. Tara Sawant  has a past medical history of Mixed hyperlipidemia (4/4/2013) and Neuralgia (4/4/2013).   Mr. Tara Sawant  has a past surgical history that includes hx cataract removal.  Social History/Living Environment:   Home Environment: Private residence  Living Alone: No  Support Systems: Spouse/Significant Other/Partner  Tub or Shower Type: Shower  Prior Level of Function/Work/Activity:  Mod I no device     Number of Personal Factors/Comorbidities that affect the Plan of Care: Brief history (0):  LOW COMPLEXITY   ASSESSMENT OF OCCUPATIONAL PERFORMANCE[de-identified]   Activities of Daily Living:   Basic ADLs (From Assessment) Complex ADLs (From Assessment)   Feeding: Supervision  Oral Facial Hygiene/Grooming: Minimum assistance  Bathing: Minimum assistance, Moderate assistance  Upper Body Dressing: Minimum assistance  Lower Body Dressing: Moderate assistance  Toileting: Minimum assistance     Grooming/Bathing/Dressing Activities of Daily Living     Cognitive Retraining  Safety/Judgement: Awareness of environment; Fall prevention                       Bed/Mat Mobility  Supine to Sit: Minimum assistance  Sit to Stand: Minimum assistance  Stand to Sit: Minimum assistance  Scooting: Minimum assistance     Most Recent Physical Functioning:   Gross Assessment:                  Posture:     Balance:  Sitting: Intact  Standing: Pull to stand; With support Bed Mobility:  Supine to Sit: Minimum assistance  Scooting: Minimum assistance  Wheelchair Mobility:     Transfers:  Sit to Stand: Minimum assistance  Stand to Sit: Minimum assistance            Patient Vitals for the past 6 hrs:   BP BP Patient Position SpO2 Pulse   12/04/20 1112 (!) 144/75 Supine 98 % 87       Mental Status  Neurologic State: Alert, Confused  Orientation Level: Disoriented to situation, Disoriented to time, Oriented to person, Oriented to place  Cognition: Follows commands  Perception: Cues to maintain midline in standing  Perseveration: No perseveration noted  Safety/Judgement: Awareness of environment, Fall prevention                          Physical Skills Involved:  1. Balance  2. Strength  3. Activity Tolerance  4. Gross Motor Control Cognitive Skills Affected (resulting in the inability to perform in a timely and safe manner):  1. Short Term Recall  2.  Long Term Memory Psychosocial Skills Affected:  1. Habits/Routines  2. Environmental Adaptation  3. Self-Awareness   Number of elements that affect the Plan of Care: 5+:  HIGH COMPLEXITY   CLINICAL DECISION MAKIN[address] [place] [address] AM-PAC 6 Clicks   Daily Activity Inpatient Short Form  How much help from another person does the patient currently need. .. Total A Lot A Little None   1. Putting on and taking off regular lower body clothing? [] 1   [x] 2   [] 3   [] 4   2. Bathing (including washing, rinsing, drying)? [] 1   [x] 2   [] 3   [] 4   3. Toileting, which includes using toilet, bedpan or urinal?   [] 1   [] 2   [x] 3   [] 4   4. Putting on and taking off regular upper body clothing? [] 1   [] 2   [x] 3   [] 4   5. Taking care of personal grooming such as brushing teeth? [] 1   [] 2   [x] 3   [] 4   6. Eating meals? [] 1   [] 2   [] 3   [x] 4   © , Trustees of [address] [place] [address], under license to [employer]. All rights reserved      Score:  Initial:17 Most Recent: X (Date: -- )    Interpretation of Tool:  Represents activities that are increasingly more difficult (i.e. Bed mobility, Transfers, Gait).     Medical Necessity:     · Patient is expected to demonstrate progress in   · Self care skills and functional mobility  ·     Reason for Services/Other Comments:  · Patient continues to require skilled intervention due to   · Above listed deficits     Use of outcome tool(s) and clinical judgement create a POC that gives a: LOW COMPLEXITY           TREATMENT:   (In addition to Assessment/Re-Assessment sessions the following treatments were rendered)     Pre-treatment Symptoms/Complaints:    Pain: Initial:   Pain Intensity 1: 0  Post Session:  0/10     Assessment/Reassessment only, no treatment provided today    Braces/Orthotics/Lines/Etc:   · O2 Device: Room air  Treatment/Session Assessment:    · Response to Treatment:  tolerated well, needs assistance due to decreased balance  · Interdisciplinary Collaboration:   o Physical Therapist  o Occupational Therapist  o Registered Nurse  · After treatment position/precautions:   o Supine in bed  o Bed alarm/tab alert on  o Bed/Chair-wheels locked  o Bed in low position  o Call light within reach  o RN notified  o Side rails x 3   · Compliance with Program/Exercises: Compliant all of the time, Will assess as treatment progresses. · Recommendations/Intent for next treatment session: \"Next visit will focus on advancements to more challenging activities and reduction in assistance provided\".   Total Treatment Duration:  OT Patient Time In/Time Out  Time In: 1300  Time Out: [address] García, OT

## 2020-12-04 NOTE — PROGRESS NOTES
Problem: Falls - Risk of  Goal: *Absence of Falls  Description: Document Albino Messing Fall Risk and appropriate interventions in the flowsheet.   Outcome: Progressing Towards Goal  Note: Fall Risk Interventions:  Mobility Interventions: Patient to call before getting OOB         Medication Interventions: Teach patient to arise slowly    Elimination Interventions: Call light in reach    History of Falls Interventions: Evaluate medications/consider consulting pharmacy

## 2020-12-05 LAB
MM INDURATION POC: 0 MM (ref 0–5)
PPD POC: NEGATIVE NEGATIVE

## 2020-12-05 PROCEDURE — 99232 SBSQ HOSP IP/OBS MODERATE 35: CPT | Performed by: INTERNAL MEDICINE

## 2020-12-05 PROCEDURE — 97116 GAIT TRAINING THERAPY: CPT

## 2020-12-05 PROCEDURE — 97530 THERAPEUTIC ACTIVITIES: CPT

## 2020-12-05 PROCEDURE — 65270000029 HC RM PRIVATE

## 2020-12-05 PROCEDURE — 99214 OFFICE O/P EST MOD 30 MIN: CPT | Performed by: SURGERY

## 2020-12-05 PROCEDURE — 97535 SELF CARE MNGMENT TRAINING: CPT

## 2020-12-05 PROCEDURE — 87635 SARS-COV-2 COVID-19 AMP PRB: CPT

## 2020-12-05 PROCEDURE — 99218 HC RM OBSERVATION: CPT

## 2020-12-05 PROCEDURE — 74011250637 HC RX REV CODE- 250/637: Performed by: FAMILY MEDICINE

## 2020-12-05 RX ADMIN — ATORVASTATIN CALCIUM 10 MG: 10 TABLET, FILM COATED ORAL at 21:09

## 2020-12-05 RX ADMIN — LORATADINE 10 MG: 10 TABLET ORAL at 08:41

## 2020-12-05 RX ADMIN — Medication 81 MG: at 08:41

## 2020-12-05 NOTE — PROGRESS NOTES
ELLE spoke with the patient's wife Yusuf Zee to discuss the discharge plan. Patient's wife states that the patient will be going home and transferring to [facility]. SW advised that would not be an immediate process and the patient is likely to need extensive physical therapy and assistance in the interim and asked if she could manage him at home. Yusuf Zee agreed that she could not in his current condition. SW asked about STR and Yusuf Zee agreed that the patient needs rehab to increase his strength and for around the clock care. ELLE called , spoke with Ricarda Stanley in intakes and admissions. Ricarda Stanley states that they can offer the patient a bed Monday, confirmed that he will need a PPD and Covid 19 test. SW will request a COVID 19 test today in anticipation of transferring to [facility] Monday.      Agustín Billy, EMILEEW    [address] [place] [address]    * [email or web address]

## 2020-12-05 NOTE — PROGRESS NOTES
[address] Hematology Oncology  Inpatient Hematology / Oncology Progress Note      Admission Date: 2020  1:29 PM  Reason for Admission/Hospital Course: Syncope [R55]  Weight loss [R63.4]  Cervical mass [N88.8]      24 Hour Events:  Doing okay this morning  Scheduling outpatient biopsy in OR   To SNF hopefully Monday       ROS:  Constitutional: Positive for weakness, fatigue. Negative for fever, chills. CV: Negative for chest pain, palpitations, edema. Respiratory: Negative for dyspnea, cough, wheezing. GI: Negative for nausea, abdominal pain, diarrhea. 10 point review of systems is otherwise negative with the exception of the elements mentioned above in the HPI. No Known Allergies    OBJECTIVE:  Patient Vitals for the past 8 hrs:   BP Temp Pulse Resp SpO2   20 1150 117/65 97.5 °F (36.4 °C) 100 18 97 %   20 1100     99 %   20 0742 137/69 97.3 °F (36.3 °C) 95 18 96 %     Temp (24hrs), Av.8 °F (36.6 °C), Min:97.3 °F (36.3 °C), Max:98.2 °F (36.8 °C)     0701 -  1900  In: 0   Out: 250 [Urine:250]    Physical Exam:  Constitutional: Well developed, well nourished male in no acute distress, sitting comfortably in the hospital bed. HEENT: Normocephalic and atraumatic. Oropharynx is clear, mucous membranes are moist.  Extraocular muscles are intact. Sclerae anicteric. Neck supple. [place]. Lymph node   +palpable mass on left chest wall, supraclavicular area. +right sided adenopathy on right neck. Skin Warm and dry. No bruising and no rash noted. No erythema. + pallor. + ecchymosis head, arms. Respiratory Lungs are clear to auscultation bilaterally without wheezes, rales or rhonchi, normal air exchange without accessory muscle use. CVS Normal rate, regular rhythm and normal S1 and S2. No murmurs, gallops, or rubs. Abdomen Soft, nontender and nondistended, normoactive bowel sounds.      Neuro Grossly nonfocal with no obvious sensory or motor deficits. MSK Normal range of motion in general.  No edema and no tenderness. Psych Appropriate mood and affect. Labs:      Recent Labs     12/03/20  0523 12/02/20  1421   WBC 6.8 9.9   RBC 3.51* 3.67*   HGB 10.7* 11.4*   HCT 33.3* 35.6*   MCV 94.9 97.0   MCH 30.5 31.1   MCHC 32.1 32.0   RDW 13.9 13.8    216   GRANS  --  90*   LYMPH  --  4*   MONOS  --  5   EOS  --  1   BASOS  --  0   IG  --  1   DF  --  AUTOMATED   ANEU  --  8.9*   ABL  --  0.4*   ABM  --  0.5   TASHA  --  0.1   ABB  --  0.0   AIG  --  0.1        Recent Labs     12/04/20  0535 12/03/20  0523 12/02/20  1421    139 138   K 4.0 4.1 4.6    107 105   CO2 26 26 24   AGAP 8 6* 9   GLU 82 74 109*   BUN 28* 36* 44*   CREA 1.34 1.39 1.71*   GFRAA >60 >60 49*   GFRNA 53* 51* 40*   CA 10.0 10.1 10.7*   AP  --   --  111   TP  --   --  7.1   ALB  --   --  3.6   GLOB  --   --  3.5   AGRAT  --   --  1.0*   MG  --   --  1.9         Imaging:  CT NECK CHEST ABD PELV W CONT [[id number]]  Collected: 12/03/20 1358    Order Status: Completed  Updated: 12/03/20 1410    Narrative:      CT of the neck, chest, abdomen, and pelvis with contrast 12/3/2020     Comparison: CT cervical spine 12/02/2020. Indication: Lymphadenopathy and anterior chest mass seen on recent CT scan of   the neck. Technique:  CT imaging was performed of the neck, chest, abdomen, and pelvis   following the uncomplicated administration of intravenous contrast (Isovue 370,   100 mL). Oral contrast was used for bowel opacification. Intravenous contrast   was used for better evaluation of solid organs and vascular structures. Radiation dose reduction techniques were used for this study:  Our CT scanners   use one or all of the following: Automated exposure control, adjustment of the   mA and/or kVp according to patient's size, iterative reconstruction. Findings:   NECK CT:   Globes and orbits intact.  Limited views of intracranial contents within normal   limits. The oropharynx, nasopharynx, larynx normal. Scattered atherosclerotic   disease identified. Acute on chronic right maxillary sinusitis. Extensive right   jugulodigastric lymphadenopathy some of which have small foci internal necrosis. Largest node posterior to the right mandibular angle measures up to 3.1 cm. Vessels patent. CHEST CT:   Large soft tissue mass left axilla extending into the subpectoral space   measuring up to approximately 17.1 x 15.1 x 9.1 cm. Adjacent inflammatory   stranding. This mass encases although does not compress left axillary vessels. There appears to be extension to the anterior lateral left subpleural space but   this process. Right axillary adenopathy also present measuring up 1.4 cm short   axis. Mild cardiac enlargement with scattered coronary artery calcification. Large hiatal hernia. Small left pleural effusion. No hilar adenopathy. No   pericardial effusion. The lung parenchyma is unremarkable. No lung mass seen. ABDOMEN CT:   There are at least 2 hypoattenuating foci within the right hepatic lobe with   largest measuring 1.9 cm series 3 image 43. Portal and hepatic veins are patent. Status post cholecystectomy. No discrete enhancing renal mass. No hydronephrosis. Solitary bilateral simple   renal cysts. The spleen, adrenal glands, pancreas unremarkable. There is no   intra or extrahepatic biliary ductal dilatation. PELVIS CT:   The bowel is normal in caliber, and there is no focal or diffuse bowel wall   thickening. Atherosclerotic disease of the aorta with infrarenal AAA measuring   up to 3.1 cm. Large periampullary duodenal diverticulum. Extensive sigmoid   diverticulosis. No CT evidence of acute diverticulitis. There is no free air or free fluid in the abdomen or pelvis. There is no significant retroperitoneal, pelvic, mesenteric, or inguinal   lymphadenopathy. The bladder is unremarkable.      There are no aggressive appearing osseous lesions. Impression:      IMPRESSION:   1. Extensive right neck adenopathy, right axillary adenopathy with marked   abnormal soft tissue mass centered at the left axilla and left subpectoral   space. Consultation of findings suggest lymphoma. 2. No discrete adenopathy below the diaphragm. 3. There are at least 2 hypoenhancing lesions within the right hepatic lobe   which may represent additional foci of disease although remain indeterminate. 4. Cardiac enlargement, small left pleural effusion, extensive atherosclerotic   disease with infrarenal 3.1 cm AAA. 5. There is colonic diverticulosis without CT evidence of acute diverticulosis. DUPLEX CAROTID BILATERAL [[id number]]  Collected: 12/02/20 2117    Order Status: Completed  Updated: 12/02/20 2120    Narrative:      CAROTID ULTRASOUND     HISTORY: Syncope     COMPARISON: None     TECHNIQUE: High resolution imaging of the carotid and vertebral arteries was   performed bilaterally with gray scale and color Doppler imaging, and Doppler   spectral analysis. FINDINGS:   *  RIGHT CAROTID: No significant plaque formation or stenosis is seen. Carotid   waveforms appeared normal.   *  LEFT CAROTID: No significant plaque formation or stenosis is seen. Carotid   waveforms appeared normal.   *  VERTEBRAL ARTERIES: Vertebral artery flow is antegrade bilaterally. VELOCITIES FOLLOW BELOW:     . .......................... RIGHT. ................ Jonny Brinks LEFT     ICA systolic. ...... Jonny Brinks 115 . ......... 70   CCA systolic. ...... Jonny Brinks 80 ......... Jonny Brinks 102   ICA/CCA ratio. ...... 1.4 ..................0.8   ICA diastolic. ..... Jonny Brinks 11 cm/sec. ........ Jonny Brinks 17 cm/sec    Impression:      IMPRESSION:     No evidence of hemodynamically significant stenosis. PQRI: Indirect Method was used. The velocity criteria are extrapolated from   diameter data as defined by the Society of Radiologists in [place] [address] Radiology 2003; 621;311-909.      Date Of Dictation: 12/2/2020 9:17 PM XR CHEST PA LAT [[id number]]  Collected: 12/02/20 1430    Order Status: Completed  Updated: 12/02/20 1432    Narrative:      Two view chest     History: Unwitnessed fall today at home, head injury. Comparison: None     Findings: The heart is normal in size. There is a large hiatal hernia. The lungs   and pleural spaces are clear. The pulmonary vascularity is within normal limits. The visualized osseous structures are unremarkable. Impression:      Impression: Large hiatal hernia. CT SPINE CERV WO CONT [[id number]]  Collected: 12/02/20 1406    Order Status: Completed  Updated: 12/02/20 1413    Narrative:      CT head and cervical spine without contrast     HISTORY:Fall while carrying groceries. TECHNIQUE: 5 mm axial images were obtained from the skull base to the vertex   without intravenous contrast. Helically acquired images were obtained spine   reconstructed at 2.5 mm thickness. Sagittal and coronal reformatted images were   submitted. All CT scans at this facility are performed using dose optimization technique as   appropriate to a performed exam, to include on automated exposure control,   adjustment of the mA and/or KV according to patient's size (including   appropriate matching for site-specific examinations), or use of iterative   reconstruction technique. COMPARISON: None     CT head without contrast:     Findings: The ventricles and sulci are prominent but felt to be appropriate for   age. There are no extra-axial fluid collections. No evidence of acute   intracranial hemorrhage or mass effect is identified. There is no evidence to   suggest an acute major territorial infarct. Patchy areas of decreased   attenuation within the supratentorial white matter are nonspecific but would be   most compatible with moderate chronic small vessel ischemic change. The bony calvarium is intact. There is moderate opacification of the right   maxillary sinus.  There is suspected mild posterior parietal soft tissue   swelling. CT cervical spine without contrast:     Findings: The vertebral body height and alignment are well maintained. The disc   space heights are well-preserved. There is no evidence of acute fracture or   subluxation. There are multiple enlarged lymph nodes within the right neck one   of which measures greater than 3 cm in size. A partially imaged mass within the   left infraclavicular region measures at least 4 cm in size. Impression:      IMPRESSION:   1. No evidence of acute intracranial hemorrhage. 2. No evidence of acute cervical spine fracture. 3. Extensive neck adenopathy. The findings would be concerning for metastatic   disease or lymphoma. 4. Moderate right maxillary sinus opacification. CT HEAD WO CONT [[id number]]  Collected: 12/02/20 1406    Order Status: Completed  Updated: 12/02/20 1413    Narrative:      CT head and cervical spine without contrast     HISTORY:Fall while carrying groceries. TECHNIQUE: 5 mm axial images were obtained from the skull base to the vertex   without intravenous contrast. Helically acquired images were obtained spine   reconstructed at 2.5 mm thickness. Sagittal and coronal reformatted images were   submitted. All CT scans at this facility are performed using dose optimization technique as   appropriate to a performed exam, to include on automated exposure control,   adjustment of the mA and/or KV according to patient's size (including   appropriate matching for site-specific examinations), or use of iterative   reconstruction technique. COMPARISON: None     CT head without contrast:     Findings: The ventricles and sulci are prominent but felt to be appropriate for   age. There are no extra-axial fluid collections. No evidence of acute   intracranial hemorrhage or mass effect is identified. There is no evidence to   suggest an acute major territorial infarct.  Patchy areas of decreased   attenuation within the supratentorial white matter are nonspecific but would be   most compatible with moderate chronic small vessel ischemic change. The bony calvarium is intact. There is moderate opacification of the right   maxillary sinus. There is suspected mild posterior parietal soft tissue   swelling. CT cervical spine without contrast:     Findings: The vertebral body height and alignment are well maintained. The disc   space heights are well-preserved. There is no evidence of acute fracture or   subluxation. There are multiple enlarged lymph nodes within the right neck one   of which measures greater than 3 cm in size. A partially imaged mass within the   left infraclavicular region measures at least 4 cm in size. Impression:      IMPRESSION:   1. No evidence of acute intracranial hemorrhage. 2. No evidence of acute cervical spine fracture. 3. Extensive neck adenopathy. The findings would be concerning for metastatic   disease or lymphoma. 4. Moderate right maxillary sinus opacification. ASSESSMENT:    Problem List  Date Reviewed: 7/9/2020          Codes Class Noted    * (Principal) Syncope ICD-10-CM: R55  ICD-9-CM: 780.2  12/2/2020        Weight loss ICD-10-CM: R63.4  ICD-9-CM: 783.21  12/2/2020        Cervical mass ICD-10-CM: N88.8  ICD-9-CM: 622.8  12/2/2020        Renal insufficiency ICD-10-CM: N28.9  ICD-9-CM: 593.9  12/2/2020        Mixed hyperlipidemia ICD-10-CM: E78.2  ICD-9-CM: 272.2  4/4/2013        Neuralgia ICD-10-CM: M79.2  ICD-9-CM: 729.2  4/4/2013            Mr. Howard Monroy is a 80 y.o. male admitted on 12/2/2020. The primary encounter diagnosis was Syncope and collapse. A diagnosis of Blunt head trauma, initial encounter was also pertinent to this visit.      Mr Howard Monroy has a PMH of hyperlipidemia. He reports he is fairly active at home and lives with his wife independently.  Mr Howard Monroy reports he has had ongoing left neck/chest wall swelling/mass for approximately 1 year that has continued to increase in size and is associated with left arm pain resolved with massage. He also reports right sided neck swelling for a similar amount of time but notes that this area has improved in size. He reported this to his PCP and an US was ordered but it does not appear that this was completed.      Mr Stepan Lopez presented to the ED on 12/2/2020 with syncopal episode that resulted in him hitting his head. CT C-spine showed concerning adenopathy. . CT CNAP showed extensive right neck adenopathy and right axillary adenopathy. Imaging also showed abnormal soft tissue mass centered at left axilla and left subpectoral space. No adenopathy below diaphragm. 2 hyperenhancing lesions within right hepatic lobe - indeterminate. Also noted cardiac enlargement and small left pleural effusion, extensive atherosclerotic disease with infrarenal 3.1 cm AAA. Carotid doppler without stenosis. 2D echo shows EF 65-70%, likely mild aortic stenosis. We have been consulted for recommendations for this patient. PLAN:  Right neck and axillary adenopathy, extensive left-sided soft tissue mass  -   - Consult surgery for surgical biopsy of left-sided mass  12/05 Surgery to schedule biopsy as an outpatient. We will see him after biopsy to discuss results. He is going to [facility] for rehab hopefully Monday.      Atherosclerotic disease / AAA  - 2D echo with aortic stenosis, preserved EF  - Carotid doppler unremarkable     Lab studies and imaging studies  were personally reviewed. Pertinent old records were reviewed.     Thank you for allowing us to participate in the care of Mr. Stepan Lopez. We will continue to follow along but will likely have nothing to add until pathology results.         Tram Lilly NP   [address] Hematology Oncology  [address] [place] [address]  Office : [phone]  Fax : [phone]       Attending Addendum:  I have personally performed a face to face diagnostic evaluation on this patient. I have reviewed and agree with the care plan as documented above by  Lissa Rios N.P.  My findings are as follows: Patient appears stable, heart rate regular without murmurs, abdomen is non-tender, bowel sounds are positive. Elderly male patient, reports independent in his ADLs, history of dyslipidemia, reports progressively worsening right neck and left chest wall/armpit swelling over the last year or so. Now admitted through ED with syncopal episode with imaging concerning for adenopathy. LDH elevated at 750. CT NCAP with extensive neck and axillary adenopathy, as well as soft tissue mass centered around left axilla/subpectoral region measuring over 17 x 15 cm.  2 liver lesions also noted. Neck adenopathy as well as chest wall masses easily palpable. 2D ECHO w normal EF. Will have surgery evaluate for biopsy, now planned in outpt setting. Further course based on results.  F/u in outpt oncology within 2 weeks from discharge w biopsy results.               Rockford Lesch, MD  [facility] Hematology/Oncology  [address] [place] [address]  Office : [phone]  Fax : [phone]

## 2020-12-05 NOTE — PROGRESS NOTES
Problem: Self Care Deficits Care Plan (Adult)  Goal: *Acute Goals and Plan of Care (Insert Text)  Outcome: Progressing Towards Goal  Note:  1. Patient will perform grooming with supervision. PROGRESSING  2. Patient will perform Upper body dressing with supervisionPROGRESSING  3. Patient will perform lower body dressing with min assist.  4. Patient will perform upper and lower body bathing with min assist.  5. Patient will perform toilet transfers with CGA/min assist.PROGRESSING  6. Patient will perform shower transfer with min assist.  7. Patient will participate in 30 + minutes of ADL/ therapeutic exercise/therapeutic activity with min rest breaks to increase activity tolerance for self care. 8. Patient will perform ADL functional mobility in room with CGA. GOAL MET 12/5/2020      Goals to be achieved in 7 days. OCCUPATIONAL THERAPY: Daily Note and AM 12/5/2020  INPATIENT: OT Visit Days: 2  Payor: [address] [place] [address] / Plan: [address] / Product Type: Managed Care Medicare /      NAME/AGE/GENDER: Britta Luther is a 80 y.o. male   PRIMARY DIAGNOSIS:  Syncope [R55]  Weight loss [R63.4]  Cervical mass [N88.8] Syncope Syncope       ICD-10: Treatment Diagnosis:    · Generalized Muscle Weakness (M62.81)  · Other lack of cordination (R27.8)  · Difficulty in walking, Not elsewhere classified (R26.2)  · Other abnormalities of gait and mobility (R26.89)  · History of falling (Z91.81)   Precautions/Allergies:     Patient has no known allergies. ASSESSMENT:     Mr. Adriane Perez presents supine in bed with above diagnosis. He fell at home and hit his head, he was bending down to put his shoes on and felt dizzy. He reported he always feels dizzy. He has a mass on his right neck and Large mass on he Left pec area of his chest. He was min assist supine to sit. He stood with min assist and ambulated around the bed with RW. He is Has some limitation in B shoulder flexion.  He is mod assist with distal LE ADL items. He is min assist for UE ADLS. He is very hard of hearing. He did not want to get up to the chair, he wanted to return to the bed. He was min assist sit to supine. He reported he lives in a big house with his wife, 4 bathroom has a walk in shower. He was unsteady on his feet. He would benefit form skilled OT. Will follow. Bed alarm on.     12/05/2020 1035am Patient min assist supine to sit. He ambulated to the bathroom with CGA. He stood to urinate. He had a BM unknown to himself. He needed assistance with rear henok hygiene. He needed assist with donning a taped brief. He changed his gown with min assist. He ambulated in the hallway with pt. This section established at most recent assessment   PROBLEM LIST (Impairments causing functional limitations):  1. Decreased Strength  2. Decreased ADL/Functional Activities  3. Decreased Transfer Abilities  4. Decreased Ambulation Ability/Technique  5. Decreased Balance  6. Decreased Flexibility/Joint Mobility   INTERVENTIONS PLANNED: (Benefits and precautions of occupational therapy have been discussed with the patient.)  1. Activities of daily living training  2. Adaptive equipment training  3. Balance training  4. Clothing management  5. Donning&doffing training  6. Neuromuscular re-eduation  7. Therapeutic activity  8. Therapeutic exercise     TREATMENT PLAN: Frequency/Duration: Follow patient 1-2 times to address above goals. Rehabilitation Potential For Stated Goals: Good     REHAB RECOMMENDATIONS (at time of discharge pending progress):    Placement: It is my opinion, based on this patient's performance to date, that Mr. Petty Ledezma may benefit from participating in 1-2 additional therapy sessions in order to continue to assess for rehab potential and then make recommendation for disposition at discharge.   Equipment:    None at this time              OCCUPATIONAL PROFILE AND HISTORY:   History of Present Injury/Illness (Reason for Referral):  See H and P  Past Medical History/Comorbidities:   Mr. Tara Sawant  has a past medical history of Mixed hyperlipidemia (4/4/2013) and Neuralgia (4/4/2013). Mr. Tara Sawant  has a past surgical history that includes hx cataract removal.  Social History/Living Environment:   Home Environment: Private residence  Living Alone: No  Support Systems: Spouse/Significant Other/Partner  Tub or Shower Type: Shower  Prior Level of Function/Work/Activity:  Mod I no device     Number of Personal Factors/Comorbidities that affect the Plan of Care: Brief history (0):  LOW COMPLEXITY   ASSESSMENT OF OCCUPATIONAL PERFORMANCE[de-identified]   Activities of Daily Living:   Basic ADLs (From Assessment) Complex ADLs (From Assessment)   Feeding: Supervision  Oral Facial Hygiene/Grooming: Minimum assistance  Bathing: Minimum assistance, Moderate assistance  Upper Body Dressing: Minimum assistance  Lower Body Dressing: Moderate assistance  Toileting: Minimum assistance     Grooming/Bathing/Dressing Activities of Daily Living   Grooming  Grooming Assistance: Stand-by assistance  Washing Hands: Stand-by assistance Cognitive Retraining  Safety/Judgement: Awareness of environment; Insight into deficits           Toileting  Toileting Assistance: Moderate assistance  Bladder Hygiene: Contact guard assistance(standing to urinate)  Bowel Hygiene: Moderate assistance  Clothing Management: Moderate assistance   Upper Body [address]: Minimum  assistance Functional Transfers  Bathroom Mobility: Contact guard assistance  Toilet Transfer : (stood to urinate)   Lower Body Dressing Assistance  Protective Undergarmet:  Moderate assistance(taped brief) Bed/Mat Mobility  Rolling: Stand-by assistance  Supine to Sit: Minimum assistance  Sit to Stand: Minimum assistance(CGA from bed, Tom from chair)  Stand to Sit: Contact guard assistance  Scooting: Stand-by assistance     Most Recent Physical Functioning:   Gross Assessment:                  Posture:  Posture (WDL): Exceptions to WDL  Posture Assessment: Forward head, Rounded shoulders  Balance:  Sitting: Intact  Standing: Pull to stand; With support; Intact Bed Mobility:  Rolling: Stand-by assistance  Supine to Sit: Minimum assistance  Scooting: Stand-by assistance  Wheelchair Mobility:     Transfers:  Sit to Stand: Minimum assistance(CGA from bed, Tom from chair)  Stand to Sit: Contact guard assistance            Patient Vitals for the past 6 hrs:   BP BP Patient Position SpO2 Pulse   20 0742 137/69 At rest 96 % 95   20 1100   99 %        Mental Status  Neurologic State: Alert  Orientation Level: Oriented to situation  Cognition: Follows commands  Perception: Appears intact  Perseveration: No perseveration noted  Safety/Judgement: Awareness of environment, Insight into deficits                          Physical Skills Involved:  1. Balance  2. Strength  3. Activity Tolerance  4. Gross Motor Control Cognitive Skills Affected (resulting in the inability to perform in a timely and safe manner):  1. Short Term Recall  2. Long Term Memory Psychosocial Skills Affected:  1. Habits/Routines  2. Environmental Adaptation  3. Self-Awareness   Number of elements that affect the Plan of Care: 5+:  HIGH COMPLEXITY   CLINICAL DECISION MAKIN [facility] [place] [address] [address] AM-PAC 6 Clicks   Daily Activity Inpatient Short Form  How much help from another person does the patient currently need. .. Total A Lot A Little None   1. Putting on and taking off regular lower body clothing? [] 1   [x] 2   [] 3   [] 4   2. Bathing (including washing, rinsing, drying)? [] 1   [x] 2   [] 3   [] 4   3. Toileting, which includes using toilet, bedpan or urinal?   [] 1   [] 2   [x] 3   [] 4   4. Putting on and taking off regular upper body clothing? [] 1   [] 2   [x] 3   [] 4   5. Taking care of personal grooming such as brushing teeth? [] 1   [] 2   [x] 3   [] 4   6. Eating meals?    [] 1   [] 2   [] 3   [x] 4   © , Trustees of [employer], under license to [employer]. All rights reserved      Score:  Initial:17 Most Recent: X (Date: -- )    Interpretation of Tool:  Represents activities that are increasingly more difficult (i.e. Bed mobility, Transfers, Gait). Medical Necessity:     · Patient is expected to demonstrate progress in   · Self care skills and functional mobility  ·     Reason for Services/Other Comments:  · Patient continues to require skilled intervention due to   · Above listed deficits     Use of outcome tool(s) and clinical judgement create a POC that gives a: LOW COMPLEXITY           TREATMENT:   (In addition to Assessment/Re-Assessment sessions the following treatments were rendered)     Pre-treatment Symptoms/Complaints:    Pain: Initial:   Pain Intensity 1: 0  Post Session:  0/10     Self Care: (10): Procedure(s) (per grid) utilized to improve and/or restore self-care/home management as related to dressing, toileting and grooming. Required moderate visual, verbal and manual cueing to facilitate activities of daily living skills and compensatory activities. Braces/Orthotics/Lines/Etc:   · O2 Device: Room air  Treatment/Session Assessment:    · Response to Treatment:  tolerated well, moving better today more steady, very [place]  · Interdisciplinary Collaboration:   o Physical Therapist  o Occupational Therapist  o Registered Nurse  o Certified Nursing Assistant/Patient Care Technician  · After treatment position/precautions:   o ambulated in the hallway with PT  · Compliance with Program/Exercises: Compliant all of the time. · Recommendations/Intent for next treatment session: \"Next visit will focus on advancements to more challenging activities and reduction in assistance provided\".   Total Treatment Duration:10  OT Patient Time In/Time Out  Time In: 8068  Time Out: [address], OT

## 2020-12-05 NOTE — PROGRESS NOTES
Problem: Falls - Risk of  Goal: *Absence of Falls  Description: Document Albino Messing Fall Risk and appropriate interventions in the flowsheet.   Outcome: Progressing Towards Goal  Note: Fall Risk Interventions:  Mobility Interventions: Bed/chair exit alarm    Mentation Interventions: Adequate sleep, hydration, pain control, Bed/chair exit alarm, Door open when patient unattended    Medication Interventions: Bed/chair exit alarm, Evaluate medications/consider consulting pharmacy    Elimination Interventions: Bed/chair exit alarm, Call light in reach, Patient to call for help with toileting needs    History of Falls Interventions: Bed/chair exit alarm, Consult care management for discharge planning

## 2020-12-05 NOTE — ACP (ADVANCE CARE PLANNING)
Patient has HCPOA documents but they are not on file. Patient has dementia and cannot answer healthcare preference questions, but he is a DNR. Patient's wife asked to has HCPOA documents brought in to be scanned into his chart.      VITALIY Jerez    [address] [place] [address]    * [email or web address]

## 2020-12-05 NOTE — PROGRESS NOTES
END OF SHIFT NOTE:    Biopsy to be done OP, Possibly [place] placement Monday. VSS. No needs voiced at this time. Intake/Output  12/05 0701 - 12/05 1900  In: 0   Out: 450 [Urine:450]   Voiding: YES  Catheter: NO  Drain:              Stool:  0 occurrences. Stool Assessment  Stool Color: Brown (12/04/20 0730)  Stool Appearance: Soft (12/04/20 0730)  Stool Amount: Medium (12/04/20 0730)  Stool Source/Status: Rectum (12/04/20 0730)    Emesis:  0 occurrences. VITAL SIGNS  Patient Vitals for the past 12 hrs:   Temp Pulse Resp BP SpO2   12/05/20 1518 97.8 °F (36.6 °C) 96 18 127/66 97 %   12/05/20 1150 97.5 °F (36.4 °C) 100 18 117/65 97 %   12/05/20 1100     99 %   12/05/20 0742 97.3 °F (36.3 °C) 95 18 137/69 96 %       Pain Assessment  Pain 1  Pain Scale 1: Numeric (0 - 10) (12/05/20 1425)  Pain Intensity 1: 0 (12/05/20 1425)  Patient Stated Pain Goal: 0 (12/04/20 0835)  Pain Reassessment 1: Patient resting w/respiratory rate greater than 10 (12/04/20 2230)  Pain Location 1: Back (12/04/20 2135)  Pain Orientation 1: Lower (12/03/20 1152)  Pain Description 1: Aching (12/04/20 2135)  Pain Intervention(s) 1: Medication (see MAR) (12/04/20 2135)    Ambulating  No- only with assistance to bathroom and with PT    Additional Information:     Shift report given to oncoming nurse at the bedside.     Best Galvin

## 2020-12-05 NOTE — PROGRESS NOTES
Problem: Mobility Impaired (Adult and Pediatric)  Goal: *Acute Goals and Plan of Care (Insert Text)  Description: STG:  (1.)Mr. Alvina Bautista will move from supine to sit and sit to supine  with CONTACT GUARD ASSIST within 4-7 treatment day(s). Progressing 12/5  (2.)Mr. Alvina Bautista will transfer from bed to chair and chair to bed with CONTACT GUARD ASSIST using the least restrictive device within 4-7 treatment day(s). Progressing 12/5  (3.)Mr. Alvina Bautista will ambulate with MINIMAL ASSIST for 100 feet with the least restrictive device within 4-7 treatment day(s). Met 12/5    ________________________________________________________________________________________________      Outcome: Progressing Towards Goal     PHYSICAL THERAPY: Daily Note and AM 12/5/2020  INPATIENT: PT Visit Days : 2  Payor: [address] [place] [address] / Plan: [address] / Product Type: [employer] Medicare /       NAME/AGE/GENDER: Hernan Veloz is a 80 y.o. male   PRIMARY DIAGNOSIS: Syncope [R55]  Weight loss [R63.4]  Cervical mass [N88.8] Syncope Syncope       ICD-10: Treatment Diagnosis:    · Generalized Muscle Weakness (M62.81)  · Difficulty in walking, Not elsewhere classified (R26.2)   Precaution/Allergies:  Patient has no known allergies. ASSESSMENT:     Mr. Alvina Bautista presents with generalized weakness and decreased independence with functional mobility. He had a fall at home in his garage and was admitted with above diagnosis, while in ED CT cervical spine showed multiple enlarged lymph nodes within the right neck one  of which measures greater than 3 cm in size. A partially imaged mass within the  left infraclavicular region measures at least 4 cm in size. The findings would be concerning for metastatic disease or lymphoma. He lives at home with his wife where he states he was independent with mobility without assistive devices. Wears bilateral hearing aids.  He will benefit from skilled PT interventions to maximize independence with functional mobility as he is well below his baseline for mobility and would not be safe to be up without assist.   12/5 Pt supine in bed and willing to work with therapy. Moved to EOB with Tom and stood with CGA. Pt ambulated with CGA to room toilet to urinate. While there, noted to have had a bowel movement that he was unaware of. Spent several minutes cleaning while pt stood, pt squatted down to wipe edge of toilet x2 with good balance and control. He then stood at edge of bed for several more minutes to finish cleaning. He ambulated with CGA and RW x 185 feet. Overall significant improvements in mobility and tolerance for activity today. Pt left up in recliner with chair alarm on and needs in reach. This section established at most recent assessment   PROBLEM LIST (Impairments causing functional limitations):  1. Decreased Strength  2. Decreased ADL/Functional Activities  3. Decreased Transfer Abilities  4. Decreased Ambulation Ability/Technique  5. Decreased Balance  6. Decreased Activity Tolerance   INTERVENTIONS PLANNED: (Benefits and precautions of physical therapy have been discussed with the patient.)  1. Balance Exercise  2. Bed Mobility  3. Gait Training  4. Therapeutic Activites  5. Therapeutic Exercise/Strengthening  6. Transfer Training     TREATMENT PLAN: Frequency/Duration: daily for duration of hospital stay  Rehabilitation Potential For Stated Goals: Good     REHAB RECOMMENDATIONS (at time of discharge pending progress):    Placement: It is my opinion, based on this patient's performance to date, that Mr. Lyle Madden may benefit from intensive therapy at a [address] [place] [address] after discharge due to the functional deficits listed above that are likely to improve with skilled rehabilitation and concerns that he/she may be unsafe to be unsupervised at home.   Equipment:    To be determined, will likely need a walker              HISTORY:   History of Present Injury/Illness (Reason for Referral):  PER MD NOTE:Patient is a 80 y.o. male who presented to the ED for cc fall after syncopal event in garage. Event was not witnessed. He states he was climbing his stairs and bent down to change his shoes. When standing back up he became dizzy and hit head. Hx of HLD, chronic pain. Also admits to 50 pound weight loss in last 6 months. Does not recall family hx of cancer. Vitals - /86     Labs- Hg 11.4. CT cervical spine showed multiple enlarged lymph nodes within the right neck one  of which measures greater than 3 cm in size. A partially imaged mass within the  left infraclavicular region measures at least 4 cm in size. The findings would be concerning for metastatic disease or lymphoma. Past Medical History/Comorbidities:   Mr. Joyce Farrell  has a past medical history of Mixed hyperlipidemia (4/4/2013) and Neuralgia (4/4/2013). Mr. Joyce Farrell  has a past surgical history that includes hx cataract removal.  Social History/Living Environment:   Home Environment: Private residence  Living Alone: No  Support Systems: Spouse/Significant Other/Partner  Tub or Shower Type: Shower  Prior Level of Function/Work/Activity:  Pt living at home with spouse, was independent with mobility without assistive devices     Number of Personal Factors/Comorbidities that affect the Plan of Care: 1-2: MODERATE COMPLEXITY   EXAMINATION:   Most Recent Physical Functioning:   Gross Assessment:  AROM: Generally decreased, functional  Strength: Generally decreased, functional               Posture:     Balance:  Sitting: Intact  Standing: Pull to stand; With support; Intact Bed Mobility:  Rolling: Stand-by assistance  Supine to Sit: Minimum assistance  Scooting: Stand-by assistance  Wheelchair Mobility:     Transfers:  Sit to Stand: Minimum assistance(CGA from bed, Tom from chair)  Stand to Sit: Contact guard assistance  Gait:     Speed/Delmy: Pace decreased (<100 feet/min)  Step Length: Right shortened;Left shortened  Gait Abnormalities: Decreased step clearance  Distance (ft): 185 Feet (ft)  Assistive Device: Walker, rolling  Ambulation - Level of Assistance: Contact guard assistance  Interventions: Verbal cues; Safety awareness training  Duration: 15 Minutes      Body Structures Involved:  1. Muscles Body Functions Affected:  1. Movement Related Activities and Participation Affected:  1. Mobility  2. Self Care   Number of elements that affect the Plan of Care: 4+: HIGH COMPLEXITY   CLINICAL PRESENTATION:   Presentation: Stable and uncomplicated: LOW COMPLEXITY   CLINICAL DECISION MAKIN[address] [place] [address] AM-PAC 6 Clicks   Basic Mobility Inpatient Short Form  How much difficulty does the patient currently have. .. Unable A Lot A Little None   1. Turning over in bed (including adjusting bedclothes, sheets and blankets)? [] 1   [] 2   [x] 3   [] 4   2. Sitting down on and standing up from a chair with arms ( e.g., wheelchair, bedside commode, etc.)   [] 1   [] 2   [x] 3   [] 4   3. Moving from lying on back to sitting on the side of the bed? [] 1   [] 2   [x] 3   [] 4   How much help from another person does the patient currently need. .. Total A Lot A Little None   4. Moving to and from a bed to a chair (including a wheelchair)? [] 1   [] 2   [x] 3   [] 4   5. Need to walk in hospital room? [] 1   [] 2   [x] 3   [] 4   6. Climbing 3-5 steps with a railing? [] 1   [] 2   [x] 3   [] 4   © , Trustees of [address] [place] [address], under license to [employer]. All rights reserved      Score:  Initial: 18 Most Recent: X (Date: -- )    Interpretation of Tool:  Represents activities that are increasingly more difficult (i.e. Bed mobility, Transfers, Gait).     Medical Necessity:     · Patient is expected to demonstrate progress in   · strength and functional technique  ·  to   · decrease assistance required with functional mobility and strengthening  · .  Reason for Services/Other Comments:  · Patient continues to require skilled intervention due to   · Inability to complete functional mobility and strengthening independently  · . Use of outcome tool(s) and clinical judgement create a POC that gives a: Clear prediction of patient's progress: LOW COMPLEXITY            TREATMENT:   (In addition to Assessment/Re-Assessment sessions the following treatments were rendered)   Pre-treatment Symptoms/Complaints:  Feeling a little better  Pain: Initial: no reports of pain  Pain Intensity 1: 0  Post Session:  no reports of pain     Gait Training (15 Minutes):  Gait training to improve and/or restore physical functioning as related to mobility, strength and balance. Ambulated 185 Feet (ft) with Contact guard assistance using a Walker, rolling and minimal Verbal cues; Safety awareness training related to their stride length and pelvis position and motion to promote proper body alignment. Instruction in performance of moving walker while turning to correct pelvis position and motion. Therapeutic Activity: (    10): Therapeutic activities including Bed transfers, Chair transfers, Toilet transfers, Ambulation on level ground and standing dynamic balance to improve mobility, strength and balance. Required minimal Verbal cues; Safety awareness training to promote dynamic balance in standing. Braces/Orthotics/Lines/Etc:   · O2 Device: Room air  Treatment/Session Assessment:    · Response to Treatment: Excellent. Improved mobility  · Interdisciplinary Collaboration:   o Physical Therapist  o Occupational Therapist  o Registered Nurse  · After treatment position/precautions:   o Up in chair  o Bed alarm/tab alert on  o Bed/Chair-wheels locked  o Call light within reach  o RN notified   · Compliance with Program/Exercises: Will assess as treatment progresses  · Recommendations/Intent for next treatment session: \"Next visit will focus on advancements to more challenging activities and reduction in assistance provided\".   Total Treatment Duration:  PT Patient Time In/Time Out  Time In: 1045  Time Out: Marcelino Townsend, PT

## 2020-12-05 NOTE — PROGRESS NOTES
Progress Note    Patient: Reynaldo Sierra MRN: [id number]  SSN: [id number]    YOB: 1932  Age: 80 y.o. Sex: male      Admit Date: 12/2/2020    LOS: 3 days     Subjective:   F/U syncope, neck masses    80 y.o. male who presented to the ED for cc fall after syncopal event in garage. Event was not witnessed. He states he was climbing his stairs and bent down to change his shoes. When standing back up he became dizzy and hit head. Hx of HLD, chronic pain. Also admits to 50 pound weight loss in last 6 months. Does not recall family hx of cancer. Hg 11.4. CT cervical spine showed multiple enlarged lymph nodes within the right neck one of which measures greater than 3 cm in size. A partially imaged mass within the left infraclavicular region measures at least 4 cm in size. The findings would be concerning for metastatic disease or lymphoma. Patient was admitted for syncope along with further work up for possible metastatic disease. . Iron studies normal. TSH normal. US carotids with no significant stenosis. ECHO with no significant structural abnormality. CT neck, chest, abdomen and pelvis with contrast showed extensive right neck adenopathy, right axillary adenopathy with marked abnormal soft tissue mass centered at the left axilla and left subpectoral space. Consultation of findings suggest lymphoma. There are at least 2 hypoenhancing lesions within the right hepatic lobe which may represent additional foci of disease although remain indeterminate. 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 Oncology consulted for further investigation who will follow as outpatient. Surgery has been consulted for biopsy. Feeling better today. No chest pain or SOB. Wife is wanting him to go to rehab.    Current Facility-Administered Medications   Medication Dose Route Frequency    acetaminophen (TYLENOL) tablet 650 mg  650 mg Oral Q6H PRN Or    acetaminophen (TYLENOL) suppository 650 mg  650 mg Rectal Q6H PRN    polyethylene glycol (MIRALAX) packet 17 g  17 g Oral DAILY PRN    ondansetron (ZOFRAN) injection 4 mg  4 mg IntraVENous Q6H PRN    aspirin delayed-release tablet 81 mg  81 mg Oral DAILY    atorvastatin (LIPITOR) tablet 10 mg  10 mg Oral QHS    traMADoL (ULTRAM) tablet 50 mg  50 mg Oral Q6H PRN    loratadine (CLARITIN) tablet 10 mg  10 mg Oral DAILY       Objective:     Vitals:    12/04/20 1952 12/04/20 2358 12/05/20 0345 12/05/20 0742   BP: 119/85 (!) 142/73 134/69 137/69   Pulse: (!) 107 98 97 95   Resp: 24 14 20 18   Temp: 97.7 °F (36.5 °C) 97.8 °F (36.6 °C) 98.1 °F (36.7 °C) 97.3 °F (36.3 °C)   SpO2: 99% 95% 95% 96%   Weight:       Height:             Intake and Output:  Current Shift: 12/05 0701 - 12/05 1900  In: 0   Out: 250 [Urine:250]  Last three shifts: 12/03 1901 - 12/05 0700  In: 480 [P.O.:480]  Out: 475 [Urine:475]    Physical Exam:   General:  Alert, cooperative, no distress, hard of hearing. Superficial skin tear to posterior head. Frail appearing   Eyes:  Conjunctivae/corneas clear. Ears:  Normal TMs and external ear canals both ears. Nose: Nares normal. Septum midline. Mouth/Throat: Large right lateral neck mass. Neck: Right lateral and anterior neck mass   Back:   deferred   Lungs:   Clear to auscultation bilaterally. Heart:  Regular rate and rhythm, S1, S2 normal   Abdomen:   Soft, non-tender. Bowel sounds normal.    Extremities: Muscle wasting. Cachetic. Large mass to left anterior chest    Pulses: 2+ and symmetric all extremities. Skin: Superficial skin tears to arms and posterior head    Lymph nodes: Cervical, supraclavicular, and axillary nodes normal.   Neurologic: CNII-XII intact. Good movement in bed.         Lab/Data Review:    Recent Results (from the past 24 hour(s))   PLEASE READ & DOCUMENT PPD TEST IN 24 HRS    Collection Time: 12/05/20  8:37 AM   Result Value Ref Range    PPD Negative Negative    mm Induration 0 0 - 5 mm       Assessment/ Plan:     Principal Problem:    Syncope (12/2/2020)    Active Problems:    Mixed hyperlipidemia (4/4/2013)      Weight loss (12/2/2020)      Cervical mass (12/2/2020)      Renal insufficiency (12/2/2020)    Syncope - Orthostatic BP negative. Vasovagal since from bending to standing position? Cardiac work up benign.      Renal insufficiency - back to baseline     Cervical mass with weight loss along with lesions in liver - Concern for lymphoma. General surgery consulted for possible biopsy. Patient wanting to know etiology of masses.      Head trauma. No bleeding on CT head    AAA - 3.1cm. Keep BP controlled. Doubt surgical candidate but can establish with vascular surgery as outpatient.  Trying to keep BP below 140 since I do not want to cause hypotension in person with syncope with weight loss.      ASA/statin    PT/OT     Patient wishes to be DNR    Hopeful dc Monday [facility] ([facility]).      DVT prophylaxis - SCDs  Signed By: Laurie Rodarte DO     December 5, 2020

## 2020-12-06 LAB
MM INDURATION POC: 0 MM (ref 0–5)
PPD POC: NEGATIVE NEGATIVE
SARS COV-2, XPGCVT: NEGATIVE
SOURCE, COVRS: NORMAL

## 2020-12-06 PROCEDURE — 65270000029 HC RM PRIVATE

## 2020-12-06 PROCEDURE — 99218 HC RM OBSERVATION: CPT

## 2020-12-06 PROCEDURE — 74011250637 HC RX REV CODE- 250/637: Performed by: FAMILY MEDICINE

## 2020-12-06 PROCEDURE — 97530 THERAPEUTIC ACTIVITIES: CPT

## 2020-12-06 RX ADMIN — TRAMADOL HYDROCHLORIDE 50 MG: 50 TABLET, FILM COATED ORAL at 10:29

## 2020-12-06 RX ADMIN — Medication 81 MG: at 09:00

## 2020-12-06 RX ADMIN — TRAMADOL HYDROCHLORIDE 50 MG: 50 TABLET, FILM COATED ORAL at 17:05

## 2020-12-06 RX ADMIN — ATORVASTATIN CALCIUM 10 MG: 10 TABLET, FILM COATED ORAL at 21:14

## 2020-12-06 RX ADMIN — LORATADINE 10 MG: 10 TABLET ORAL at 09:05

## 2020-12-06 NOTE — PROGRESS NOTES
END OF SHIFT NOTE:    Intake/Output  No intake/output data recorded. Voiding: YES  Catheter: YES  Drain:    Stool:  0 occurrences. Stool Assessment  Stool Color: Brown (12/04/20 0730)  Stool Appearance: Soft (12/06/20 0857)  Stool Amount: Medium (12/04/20 0730)  Stool Source/Status: Rectum (12/04/20 0730)    Emesis:  0 occurrences. VITAL SIGNS  Patient Vitals for the past 12 hrs:   Temp Pulse Resp BP SpO2   12/06/20 1457 98 °F (36.7 °C)       12/06/20 1411  95  127/63 97 %   12/06/20 1116  88 22 130/70 94 %   12/06/20 0738  91 22 106/68 99 %       Pain Assessment  Pain 1  Pain Scale 1: Visual (12/06/20 1059)  Pain Intensity 1: 0 (12/06/20 1059)  Patient Stated Pain Goal: 0 (12/06/20 1059)  Pain Reassessment 1: Patient resting w/respiratory rate greater than 10 (12/06/20 1059)  Pain Onset 1: now (12/06/20 1029)  Pain Location 1: Arm (12/06/20 1029)  Pain Orientation 1: Left;Upper; Inner (12/06/20 1029)  Pain Description 1: Aching (12/06/20 1029)  Pain Intervention(s) 1: Medication (see MAR) (12/06/20 1029)    Ambulating  Yes, with assist    Additional Information:     Plan is for pt to go to [facility] tomorrow, then schedule Biopsy OP. Pt did not eat much today, gave pt croissant, fruit and baked chips due to pt not being satisfied with dinner. Pt at maybe 25% of meal.   Shift report given to oncoming nurse at the bedside.     Yomi Sandy

## 2020-12-06 NOTE — PROGRESS NOTES
Problem: Mobility Impaired (Adult and Pediatric)  Goal: *Acute Goals and Plan of Care (Insert Text)  Description: STG:  (1.)Mr. Kelsy Curiel will move from supine to sit and sit to supine  with CONTACT GUARD ASSIST within 4-7 treatment day(s). Progressing 12/5  (2.)Mr. Kelsy Curiel will transfer from bed to chair and chair to bed with CONTACT GUARD ASSIST using the least restrictive device within 4-7 treatment day(s). Progressing 12/5  (3.)Mr. Kelsy Curiel will ambulate with MINIMAL ASSIST for 100 feet with the least restrictive device within 4-7 treatment day(s). Met 12/5    ________________________________________________________________________________________________      Outcome: Progressing Towards Goal     PHYSICAL THERAPY: Daily Note and AM 12/6/2020  INPATIENT: PT Visit Days : 2  Payor: [address] [place] [address] / Plan: [address] / Product Type: [employer] /       NAME/AGE/GENDER: Yoon Myrick is a 80 y.o. male   PRIMARY DIAGNOSIS: Syncope [R55]  Weight loss [R63.4]  Cervical mass [N88.8] Syncope Syncope       ICD-10: Treatment Diagnosis:    · Generalized Muscle Weakness (M62.81)  · Difficulty in walking, Not elsewhere classified (R26.2)   Precaution/Allergies:  Patient has no known allergies. ASSESSMENT:     Mr. Kelsy Curiel presents with generalized weakness and decreased independence with functional mobility. He had a fall at home in his garage and was admitted with above diagnosis, while in ED CT cervical spine showed multiple enlarged lymph nodes within the right neck one  of which measures greater than 3 cm in size. A partially imaged mass within the  left infraclavicular region measures at least 4 cm in size. The findings would be concerning for metastatic disease or lymphoma. He lives at home with his wife where he states he was independent with mobility without assistive devices. Wears bilateral hearing aids.  He will benefit from skilled PT interventions to maximize independence with functional mobility as he is well below his baseline for mobility and would not be safe to be up without assist.   12/5 Pt supine in bed and willing to work with therapy. Moved to EOB with Tom and stood with CGA. Pt ambulated with CGA to room toilet to urinate. While there, noted to have had a bowel movement that he was unaware of. Spent several minutes cleaning while pt stood, pt squatted down to wipe edge of toilet x2 with good balance and control. He then stood at edge of bed for several more minutes to finish cleaning. He ambulated with CGA and RW x 185 feet. Overall significant improvements in mobility and tolerance for activity today. Pt left up in recliner with chair alarm on and needs in reach. 12/6--Pt appears disinterested in getting up to bedside chair to eat breakfast this morning. Pt set up for breakfast, however, the bed pad alarm triggered whenever pt tried to take a bite of breakfast. Pt performed rolling to position bed alarm pad, and pt slid up in bed with assistance in order to eat comfortable. Pt left positioned in bed so that he could eat breakfast. CNA attending. This section established at most recent assessment   PROBLEM LIST (Impairments causing functional limitations):  1. Decreased Strength  2. Decreased ADL/Functional Activities  3. Decreased Transfer Abilities  4. Decreased Ambulation Ability/Technique  5. Decreased Balance  6. Decreased Activity Tolerance   INTERVENTIONS PLANNED: (Benefits and precautions of physical therapy have been discussed with the patient.)  1. Balance Exercise  2. Bed Mobility  3. Gait Training  4. Therapeutic Activites  5. Therapeutic Exercise/Strengthening  6. Transfer Training     TREATMENT PLAN: Frequency/Duration: daily for duration of hospital stay  Rehabilitation Potential For Stated Goals: Good     REHAB RECOMMENDATIONS (at time of discharge pending progress):    Placement:   It is my opinion, based on this patient's performance to date, that Mr. Luma Ashley may benefit from intensive therapy at a [address] [place] [address] after discharge due to the functional deficits listed above that are likely to improve with skilled rehabilitation and concerns that he/she may be unsafe to be unsupervised at home. Equipment:    To be determined, will likely need a walker              HISTORY:   History of Present Injury/Illness (Reason for Referral):  PER MD NOTE:Patient is a 80 y.o. male who presented to the ED for cc fall after syncopal event in [facility]. Event was not witnessed. He states he was climbing his stairs and bent down to change his shoes. When standing back up he became dizzy and hit head. Hx of HLD, chronic pain. Also admits to 50 pound weight loss in last 6 months. Does not recall family hx of cancer. Vitals - /86     Labs- Hg 11.4. CT cervical spine showed multiple enlarged lymph nodes within the right neck one  of which measures greater than 3 cm in size. A partially imaged mass within the  left infraclavicular region measures at least 4 cm in size. The findings would be concerning for metastatic disease or lymphoma. Past Medical History/Comorbidities:   Mr. Luma Ashley  has a past medical history of Mixed hyperlipidemia (4/4/2013) and Neuralgia (4/4/2013).   Mr. Luma Ashley  has a past surgical history that includes hx cataract removal.  Social History/Living Environment:   Home Environment: Private residence  Living Alone: No  Support Systems: Spouse/Significant Other/Partner  Tub or Shower Type: Shower  Prior Level of Function/Work/Activity:  Pt living at home with spouse, was independent with mobility without assistive devices     Number of Personal Factors/Comorbidities that affect the Plan of Care: 1-2: MODERATE COMPLEXITY   EXAMINATION:   Most Recent Physical Functioning:   Gross Assessment:                  Posture:     Balance:    Bed Mobility:  Rolling: Stand-by assistance  Wheelchair Mobility:     Transfers:     Gait: Body Structures Involved:  1. Muscles Body Functions Affected:  1. Movement Related Activities and Participation Affected:  1. Mobility  2. Self Care   Number of elements that affect the Plan of Care: 4+: HIGH COMPLEXITY   CLINICAL PRESENTATION:   Presentation: Stable and uncomplicated: LOW COMPLEXITY   CLINICAL DECISION MAKIN[address] [place] [address] [address] [id number] AM-PAC 6 Clicks   Basic Mobility Inpatient Short Form  How much difficulty does the patient currently have. .. Unable A Lot A Little None   1. Turning over in bed (including adjusting bedclothes, sheets and blankets)? [] 1   [] 2   [x] 3   [] 4   2. Sitting down on and standing up from a chair with arms ( e.g., wheelchair, bedside commode, etc.)   [] 1   [] 2   [x] 3   [] 4   3. Moving from lying on back to sitting on the side of the bed? [] 1   [] 2   [x] 3   [] 4   How much help from another person does the patient currently need. .. Total A Lot A Little None   4. Moving to and from a bed to a chair (including a wheelchair)? [] 1   [] 2   [x] 3   [] 4   5. Need to walk in hospital room? [] 1   [] 2   [x] 3   [] 4   6. Climbing 3-5 steps with a railing? [] 1   [] 2   [x] 3   [] 4   © , Trustees of [address] [place] [address] [address] [id number], under license to [employer]. All rights reserved      Score:  Initial: 18 Most Recent: X (Date: -- )    Interpretation of Tool:  Represents activities that are increasingly more difficult (i.e. Bed mobility, Transfers, Gait). Medical Necessity:     · Patient is expected to demonstrate progress in   · strength and functional technique  ·  to   · decrease assistance required with functional mobility and strengthening  · .  Reason for Services/Other Comments:  · Patient continues to require skilled intervention due to   · Inability to complete functional mobility and strengthening independently  · .    Use of outcome tool(s) and clinical judgement create a POC that gives a: Clear prediction of patient's progress: LOW COMPLEXITY TREATMENT:   (In addition to Assessment/Re-Assessment sessions the following treatments were rendered)   Pre-treatment Symptoms/Complaints:  Feeling a little better  Pain: Initial: no reports of pain     Post Session:  no reports of pain     Gait Training ( ):  Gait training to improve and/or restore physical functioning as related to mobility, strength and balance. Ambulated   with   using a   and minimal   related to their stride length and pelvis position and motion to promote proper body alignment. Instruction in performance of moving walker while turning to correct pelvis position and motion. Therapeutic Activity: (    15 minutes): Therapeutic activities including Bed transfers, Chair transfers, Toilet transfers, Ambulation on level ground and standing dynamic balance to improve mobility, strength and balance. Required minimal   to promote dynamic balance in standing. Braces/Orthotics/Lines/Etc:   · O2 Device: Room air  Treatment/Session Assessment:    · Response to Treatment: good participation with mobility, despite not wanting to work with therapy today. · Interdisciplinary Collaboration:   o Physical Therapist  o Registered Nurse  o Certified Nursing Assistant/Patient Care Technician  · After treatment position/precautions:   o Supine in bed  o Bed alarm/tab alert on  o Bed/Chair-wheels locked  o Bed in low position  o Call light within reach  o RN notified   · Compliance with Program/Exercises: Will assess as treatment progresses  · Recommendations/Intent for next treatment session: \"Next visit will focus on advancements to more challenging activities and reduction in assistance provided\".   Total Treatment Duration:  PT Patient Time In/Time Out  Time In: 0900  Time Out: 8402 Mobil Oto Servis, PT

## 2020-12-06 NOTE — PROGRESS NOTES
Progress Note    Patient: Juan Anderson MRN: [id number]  SSN: [id number]    YOB: 1932  Age: 80 y.o. Sex: male      Admit Date: 12/2/2020    LOS: 4 days     Subjective:   F/U syncope, neck masses    80 y.o. male who presented to the ED for cc fall after syncopal event in garage. Event was not witnessed. He states he was climbing his stairs and bent down to change his shoes. When standing back up he became dizzy and hit head. Hx of HLD, chronic pain. Also admits to 50 pound weight loss in last 6 months. Does not recall family hx of cancer. Hg 11.4. CT cervical spine showed multiple enlarged lymph nodes within the right neck one of which measures greater than 3 cm in size. A partially imaged mass within the left infraclavicular region measures at least 4 cm in size. The findings would be concerning for metastatic disease or lymphoma. Patient was admitted for syncope along with further work up for possible metastatic disease. . Iron studies normal. TSH normal. US carotids with no significant stenosis. ECHO with no significant structural abnormality. CT neck, chest, abdomen and pelvis with contrast showed extensive right neck adenopathy, right axillary adenopathy with marked abnormal soft tissue mass centered at the left axilla and left subpectoral space. Consultation of findings suggest lymphoma. There are at least 2 hypoenhancing lesions within the right hepatic lobe which may represent additional foci of disease although remain indeterminate. 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 Will need to establish with vascular surgery as outpatient. Keep BP controlled with systolic around 121. Oncology consulted for further investigation who will follow as outpatient. Surgery has been consulted for biopsy that they will schedule as outpatient next week. Feeling better today. No chest pain or SOB.  Wife is wanting him to go to rehab. [facility] to accept tomorrow   Current Facility-Administered Medications   Medication Dose Route Frequency    acetaminophen (TYLENOL) tablet 650 mg  650 mg Oral Q6H PRN    Or    acetaminophen (TYLENOL) suppository 650 mg  650 mg Rectal Q6H PRN    polyethylene glycol (MIRALAX) packet 17 g  17 g Oral DAILY PRN    ondansetron (ZOFRAN) injection 4 mg  4 mg IntraVENous Q6H PRN    aspirin delayed-release tablet 81 mg  81 mg Oral DAILY    atorvastatin (LIPITOR) tablet 10 mg  10 mg Oral QHS    traMADoL (ULTRAM) tablet 50 mg  50 mg Oral Q6H PRN    loratadine (CLARITIN) tablet 10 mg  10 mg Oral DAILY       Objective:     Vitals:    12/05/20 1902 12/05/20 2328 12/06/20 0329 12/06/20 0738   BP: 126/60 129/71 135/69 106/68   Pulse: 98 96 95 91   Resp: 20 16 22 22   Temp: 98.1 °F (36.7 °C) 98.2 °F (36.8 °C) 97.9 °F (36.6 °C)    SpO2: 95% 97% 94% 99%   Weight:       Height:             Intake and Output:  Current Shift: No intake/output data recorded. Last three shifts: 12/04 1901 - 12/06 0700  In: 0   Out: 800 [Urine:800]    Physical Exam:   General:  Alert, cooperative, no distress, hard of hearing. Pleasant. Pale. Frail appearing   Eyes:  Conjunctivae/corneas clear. Ears:  Normal TMs and external ear canals both ears. Nose: Nares normal. Septum midline. Mouth/Throat: Large right lateral neck mass. Neck: Right lateral and anterior neck mass   Back:   deferred   Lungs:   Clear to auscultation bilaterally. Heart:  Regular rate and rhythm, S1, S2 normal   Abdomen:   Soft, non-tender. Bowel sounds normal.    Extremities: Muscle wasting. Cachetic. Large mass to left anterior chest    Pulses: 2+ and symmetric all extremities. Skin: Superficial skin tears to arms and posterior head    Lymph nodes: Cervical, supraclavicular, and axillary nodes normal.   Neurologic: CNII-XII intact. Good movement in bed.         Lab/Data Review:    Recent Results (from the past 24 hour(s))   PLEASE READ & DOCUMENT PPD TEST IN 24 HRS    Collection Time: 12/05/20  8:37 AM   Result Value Ref Range    PPD Negative Negative    mm Induration 0 0 - 5 mm   SARS-COV-2    Collection Time: 12/05/20 11:21 AM   Result Value Ref Range    Specimen source Nasopharyngeal      SARS CoV-2 PENDING        Assessment/ Plan:     Principal Problem:    Syncope (12/2/2020)    Active Problems:    Mixed hyperlipidemia (4/4/2013)      Weight loss (12/2/2020)      Cervical mass (12/2/2020)      Renal insufficiency (12/2/2020)    Syncope - Orthostatic BP negative. Vasovagal since from bending to standing position? Cardiac work up benign.      Renal insufficiency - back to baseline     Cervical mass with weight loss along with lesions in liver - Concern for lymphoma. General surgery consulted for possible biopsy that they plan to schedule next week as outpatient. Patient wanting to know etiology of masses.      Head trauma. No bleeding on CT head    AAA - 3.1cm. Keep BP controlled. Doubt surgical candidate but can establish with vascular surgery as outpatient. Trying to keep BP below 140 since I do not want to cause hypotension in person with syncope with weight loss.      ASA/statin    PT/OT     Patient wishes to be DNR    Hopeful dc Monday to [facility] ([facility]).      I have left a voicemail for the wife.      DVT prophylaxis - SCDs  Signed By: Nahum Lloyd DO     December 6, 2020

## 2020-12-06 NOTE — PROGRESS NOTES
Problem: Falls - Risk of  Goal: *Absence of Falls  Description: Document Chad Purvis Fall Risk and appropriate interventions in the flowsheet.   Outcome: Progressing Towards Goal  Note: Fall Risk Interventions:  Mobility Interventions: Bed/chair exit alarm    Mentation Interventions: Adequate sleep, hydration, pain control, Bed/chair exit alarm, Door open when patient unattended    Medication Interventions: Bed/chair exit alarm    Elimination Interventions: Bed/chair exit alarm    History of Falls Interventions: Bed/chair exit alarm         Problem: Patient Education: Go to Patient Education Activity  Goal: Patient/Family Education  Outcome: Progressing Towards Goal     Problem: Patient Education: Go to Patient Education Activity  Goal: Patient/Family Education  Outcome: Progressing Towards Goal     Problem: Patient Education: Go to Patient Education Activity  Goal: Patient/Family Education  Outcome: Progressing Towards Goal

## 2020-12-07 PROBLEM — I71.43 ANEURYSM OF INFRARENAL ABDOMINAL AORTA: Status: ACTIVE | Noted: 2020-12-07

## 2020-12-07 PROCEDURE — 99218 HC RM OBSERVATION: CPT

## 2020-12-07 PROCEDURE — 97530 THERAPEUTIC ACTIVITIES: CPT

## 2020-12-07 PROCEDURE — 65270000029 HC RM PRIVATE

## 2020-12-07 PROCEDURE — 74011250637 HC RX REV CODE- 250/637: Performed by: FAMILY MEDICINE

## 2020-12-07 PROCEDURE — 97110 THERAPEUTIC EXERCISES: CPT

## 2020-12-07 RX ORDER — TRAMADOL HYDROCHLORIDE 50 MG/1
50 TABLET ORAL
Qty: 12 TAB | Refills: 0 | Status: SHIPPED | OUTPATIENT
Start: 2020-12-07 | End: 2021-06-05

## 2020-12-07 RX ADMIN — Medication 81 MG: at 07:51

## 2020-12-07 RX ADMIN — TRAMADOL HYDROCHLORIDE 50 MG: 50 TABLET, FILM COATED ORAL at 07:51

## 2020-12-07 RX ADMIN — LORATADINE 10 MG: 10 TABLET ORAL at 07:51

## 2020-12-07 RX ADMIN — ATORVASTATIN CALCIUM 10 MG: 10 TABLET, FILM COATED ORAL at 20:49

## 2020-12-07 RX ADMIN — TRAMADOL HYDROCHLORIDE 50 MG: 50 TABLET, FILM COATED ORAL at 15:08

## 2020-12-07 NOTE — DISCHARGE SUMMARY
Hospitalist Discharge Summary     Patient ID:  Serena Pastor  [id number]  14 y.o.  2/1/1932  Admit date: 12/2/2020  1:29 PM  Discharge date and time: 12/7/2020  Attending: Lizzie Boone DO  PCP:  Betty Cai MD  Treatment Team: Attending Provider: Lizzie Boone DO; : Timur Mistry; Care Manager: Kels[facility]; Consulting Provider: Lizet Montoya MD; Surgeon: Lizet Montoya MD; Physical Therapy Assistant: Sukhdev Andrade PTA; Primary Nurse: Lorie Anderson; Occupational Therapist: Carey Gonzalez OT    Principal Diagnosis Syncope   Principal Problem:    Syncope (12/2/2020)    Active Problems:    Mixed hyperlipidemia (4/4/2013)      Weight loss (12/2/2020)      Cervical mass (12/2/2020)      Renal insufficiency (12/2/2020)      Aneurysm of infrarenal abdominal aorta ([address] (12/7/2020)     Hospital Course: 80 y. o. male who presented to the ED for cc fall after syncopal event in [facility]. Event was not witnessed. He states he was climbing his stairs and bent down to change his shoes. When standing back up he became dizzy and hit head. Hx of HLD, chronic pain. Also admits to 50 pound weight loss in last 6 months. Does not recall family hx of cancer. Hg 11.4. CT cervical spine showed multiple enlarged lymph nodes within the right neck one of which measures greater than 3 cm in size. A partially imaged mass within the left infraclavicular region measures at least 4 cm in size. The findings would be concerning for metastatic disease or lymphoma. Patient was admitted for syncope along with further work up for possible metastatic disease. . Iron studies normal. TSH normal. US carotids with no significant stenosis. ECHO with no significant structural abnormality.  CT neck, chest, abdomen and pelvis with contrast showed extensive right neck adenopathy, right axillary adenopathy with marked abnormal soft tissue mass centered at the left axilla and left subpectoral space. Consultation of findings suggest lymphoma. There are at least 2 hypoenhancing lesions within the right hepatic lobe which may represent additional foci of disease although remain indeterminate. 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 Will need to establish with vascular surgery as outpatient via PCP. Keep BP controlled with systolic around 263.     Oncology consulted for further investigation who will follow as outpatient. Surgery has been consulted for biopsy that they will schedule as outpatient next week. Rehab needed. I have left a voicemail to the wife. If worsening AMS, chest pain, SOB, please come back to the ED. Please refer to the admission H&P for details of presentation. In summary, the patient is stable for discharge. Significant Diagnostic Studies:       Labs: Results:       Chemistry No results for input(s): GLU, NA, K, CL, CO2, BUN, CREA, CA, AGAP, BUCR, TBIL, AP, TP, ALB, GLOB, AGRAT in the last 72 hours. No lab exists for component: GPT   CBC w/Diff No results for input(s): WBC, RBC, HGB, HCT, PLT, GRANS, LYMPH, EOS, HGBEXT, HCTEXT, PLTEXT, HGBEXT, HCTEXT, PLTEXT in the last 72 hours. Cardiac Enzymes No results for input(s): CPK, CKND1, DEBBIE in the last 72 hours. No lab exists for component: CKRMB, TROIP   Coagulation No results for input(s): PTP, INR, APTT, INREXT, INREXT in the last 72 hours. Lipid Panel Lab Results   Component Value Date/Time    Cholesterol, total 152 06/17/2019 09:37 AM    HDL Cholesterol 48 06/17/2019 09:37 AM    LDL, calculated 90 06/17/2019 09:37 AM    VLDL, calculated 14 06/17/2019 09:37 AM    Triglyceride 70 06/17/2019 09:37 AM      BNP No results for input(s): BNPP in the last 72 hours. Liver Enzymes No results for input(s): TP, ALB, TBIL, AP in the last 72 hours.     No lab exists for component: SGOT, GPT, DBIL   Thyroid Studies Lab Results   Component Value Date/Time    TSH 1.130 12/02/2020 02:21 PM            Discharge Exam:  Visit Vitals  BP (!) 120/58 (BP 1 Location: Left arm, BP Patient Position: At rest)   Pulse 78   Temp 98.3 °F (36.8 °C)   Resp 18   Ht 6' 1\" (1.854 m)   Wt 71.2 kg (157 lb)   SpO2 95%   BMI 20.71 kg/m²     General appearance: alert, cooperative, frail appearing, very pleasant. Superficial skin tear to posterior head that is healing   Lungs: clear to auscultation bilaterally  Heart: regular rate and rhythm, S1, S2 normal, no murmur  Abdomen: soft, non-tender. Bowel sounds normal.   Extremities: no cyanosis or edema  Neurologic: Grossly normal    Disposition:Rehab   Discharge Condition: stable  Patient Instructions: As above   Current Discharge Medication List      CONTINUE these medications which have CHANGED    Details   traMADoL (ULTRAM) 50 mg tablet Take 1 Tab by mouth every six (6) hours as needed for Pain for up to 180 days. Max Daily Amount: 200 mg. TAKE 1 TABLET EVERY 6 HOURS  Qty: 12 Tab, Refills: 0    Associated Diagnoses: Neuralgia         CONTINUE these medications which have NOT CHANGED    Details   atorvastatin (LIPITOR) 10 mg tablet take 1 tablet by mouth once daily  Qty: 30 Tab, Refills: 11    Comments: May do 90 day supply if insurance allows      levocetirizine (XYZAL) 5 mg tablet Take 1 Tab by mouth daily. Indications: Allergic Rhinitis  Qty: 30 Tab, Refills: 5    Comments: Label: for ear blockage  Associated Diagnoses: Seromucinous otitis media of both ears      aspirin delayed-release 81 mg tablet Take  by mouth daily. Activity:Up and loree with assistance   Diet:Cardiac   Wound Care:None     Follow-up PCP in one week. General surgery in one week for neck biopsy. Oncology in two weeks to discuss pathology report.    ·     Time spent to discharge patient 35 minutes  Signed:  Jesus Cruz DO  12/7/2020  8:29 AM

## 2020-12-07 NOTE — PROGRESS NOTES
END OF SHIFT NOTE:    Intake/Output  12/07 0701 - 12/07 1900  In: 440 [P.O.:440]  Out: 395 [Urine:395]   Voiding: YES  Catheter: NO  Drain:    Stool:  0 occurrences. Stool Assessment  Stool Color: Brown (12/04/20 0730)  Stool Appearance: Soft (12/06/20 0857)  Stool Amount: Medium (12/04/20 0730)  Stool Source/Status: Rectum (12/04/20 0730)    Emesis:  0 occurrences. VITAL SIGNS  Patient Vitals for the past 12 hrs:   Temp Pulse Resp BP SpO2   12/07/20 1516 98.4 °F (36.9 °C) 90 18 114/70 95 %   12/07/20 1116 98.5 °F (36.9 °C) 92 18 120/70 94 %   12/07/20 0720 98.3 °F (36.8 °C) 78 18 (!) 120/58 95 %       Pain Assessment  Pain 1  Pain Scale 1: Visual (12/07/20 1538)  Pain Intensity 1: 0 (12/07/20 1538)  Patient Stated Pain Goal: 0 (12/07/20 1538)  Pain Reassessment 1: Patient resting w/respiratory rate greater than 10 (12/07/20 1538)  Pain Onset 1: ongoing (12/07/20 1508)  Pain Location 1: Hand (12/07/20 1508)  Pain Orientation 1: Left;Lateral (12/07/20 1508)  Pain Description 1: Aching (12/07/20 1508)  Pain Intervention(s) 1: Medication (see MAR) (12/07/20 1508)    Ambulating  Yes    Additional Information:   Pt wasn't able to go to [facility] today due to an insurance issue. Pt is pleasant and understands the situation, pt called wife and updated her, gave  2 doses of tramadol for pain in left arm during shift, pain subsided with each dose. Shift report given to oncoming nurse Daina Nguyễn RN at the bedside.     Denise Jara

## 2020-12-07 NOTE — PROGRESS NOTES
ELLE received message from Jennifer at [facility] that she received auth & pt could admit in the morning. Pt received a bed offer to [facility] which is accepted. However, pt has [employer] which will need to approve admission to [facility]. ELLE has communicated several times with Pamela at [facility] who is trying to have auth expedited.

## 2020-12-07 NOTE — PROGRESS NOTES
Problem: Mobility Impaired (Adult and Pediatric)  Goal: *Acute Goals and Plan of Care (Insert Text)  Description: STG:  (1.)Mr. Breann Hines will move from supine to sit and sit to supine  with CONTACT GUARD ASSIST within 4-7 treatment day(s). Progressing 12/5  (2.)Mr. Breann Hines will transfer from bed to chair and chair to bed with CONTACT GUARD ASSIST using the least restrictive device within 4-7 treatment day(s). Progressing 12/5  (3.)Mr. Breann Hines will ambulate with MINIMAL ASSIST for 100 feet with the least restrictive device within 4-7 treatment day(s). Met 12/5    ________________________________________________________________________________________________      Outcome: Progressing Towards Goal     PHYSICAL THERAPY: Daily Note and AM 12/7/2020  INPATIENT: PT Visit Days : 4  Payor: [address] [place] [address] / Plan: [address] / Product Type: Managed Care Medicare /       NAME/AGE/GENDER: Xena Cardenas is a 80 y.o. male   PRIMARY DIAGNOSIS: Syncope [R55]  Weight loss [R63.4]  Cervical mass [N88.8] Syncope Syncope       ICD-10: Treatment Diagnosis:    · Generalized Muscle Weakness (M62.81)  · Difficulty in walking, Not elsewhere classified (R26.2)   Precaution/Allergies:  Patient has no known allergies. ASSESSMENT:     Mr. Breann Hines presents with generalized weakness and decreased independence with functional mobility. He had a fall at home in his garage and was admitted with above diagnosis, while in ED CT cervical spine showed multiple enlarged lymph nodes within the right neck one  of which measures greater than 3 cm in size. A partially imaged mass within the  left infraclavicular region measures at least 4 cm in size. The findings would be concerning for metastatic disease or lymphoma. He lives at home with his wife where he states he was independent with mobility without assistive devices. Wears bilateral hearing aids.  He will benefit from skilled PT interventions to maximize independence with functional mobility as he is well below his baseline for mobility and would not be safe to be up without assist.   12/5 Pt supine in bed and willing to work with therapy. Moved to EOB with Tom and stood with CGA. Pt ambulated with CGA to room toilet to urinate. While there, noted to have had a bowel movement that he was unaware of. Spent several minutes cleaning while pt stood, pt squatted down to wipe edge of toilet x2 with good balance and control. He then stood at edge of bed for several more minutes to finish cleaning. He ambulated with CGA and RW x 185 feet. Overall significant improvements in mobility and tolerance for activity today. Pt left up in recliner with chair alarm on and needs in reach. 12/6--Pt appears disinterested in getting up to bedside chair to eat breakfast this morning. Pt set up for breakfast, however, the bed pad alarm triggered whenever pt tried to take a bite of breakfast. Pt performed rolling to position bed alarm pad, and pt slid up in bed with assistance in order to eat comfortable. Pt left positioned in bed so that he could eat breakfast. CNA attending. 12/7 pt stated I do not want to get in the chair. Agree to work on bed mobility as follows: rolling from L<>R with Min A to get relief off his back. Then performs exercises with guidance. He is going to rehab today. This section established at most recent assessment   PROBLEM LIST (Impairments causing functional limitations):  1. Decreased Strength  2. Decreased ADL/Functional Activities  3. Decreased Transfer Abilities  4. Decreased Ambulation Ability/Technique  5. Decreased Balance  6. Decreased Activity Tolerance   INTERVENTIONS PLANNED: (Benefits and precautions of physical therapy have been discussed with the patient.)  1. Balance Exercise  2. Bed Mobility  3. Gait Training  4. Therapeutic Activites  5. Therapeutic Exercise/Strengthening  6.  Transfer Training     TREATMENT PLAN: Frequency/Duration: daily for duration of hospital stay  Rehabilitation Potential For Stated Goals: Good     REHAB RECOMMENDATIONS (at time of discharge pending progress):    Placement: It is my opinion, based on this patient's performance to date, that Mr. Lisa Santillan may benefit from intensive therapy at a [address] after discharge due to the functional deficits listed above that are likely to improve with skilled rehabilitation and concerns that he/she may be unsafe to be unsupervised at home. Equipment:    To be determined, will likely need a walker              HISTORY:   History of Present Injury/Illness (Reason for Referral):  PER MD NOTE:Patient is a 80 y.o. male who presented to the ED for cc fall after syncopal event in [facility]. Event was not witnessed. He states he was climbing his stairs and bent down to change his shoes. When standing back up he became dizzy and hit head. Hx of HLD, chronic pain. Also admits to 50 pound weight loss in last 6 months. Does not recall family hx of cancer. Vitals - /86     Labs- Hg 11.4. CT cervical spine showed multiple enlarged lymph nodes within the right neck one  of which measures greater than 3 cm in size. A partially imaged mass within the  left infraclavicular region measures at least 4 cm in size. The findings would be concerning for metastatic disease or lymphoma. Past Medical History/Comorbidities:   Mr. Lisa Santillan  has a past medical history of Mixed hyperlipidemia (4/4/2013) and Neuralgia (4/4/2013).   Mr. Lisa Santillan  has a past surgical history that includes hx cataract removal.  Social History/Living Environment:   Home Environment: Private residence  Living Alone: No  Support Systems: Spouse/Significant Other/Partner  Tub or Shower Type: Shower  Prior Level of Function/Work/Activity:  Pt living at home with spouse, was independent with mobility without assistive devices     Number of Personal Factors/Comorbidities that affect the Plan of Care: 1-2: MODERATE COMPLEXITY   EXAMINATION:   Most Recent Physical Functioning:   Gross Assessment:                  Posture:     Balance:    Bed Mobility:  Rolling: Minimum assistance(left <>right x 4 )  Duration: 15 Minutes  Wheelchair Mobility:     Transfers:     Gait:            Body Structures Involved:  1. Muscles Body Functions Affected:  1. Movement Related Activities and Participation Affected:  1. Mobility  2. Self Care   Number of elements that affect the Plan of Care: 4+: HIGH COMPLEXITY   CLINICAL PRESENTATION:   Presentation: Stable and uncomplicated: LOW COMPLEXITY   CLINICAL DECISION MAKIN[address] [place] [address] AM-PAC 6 Clicks   Basic Mobility Inpatient Short Form  How much difficulty does the patient currently have. .. Unable A Lot A Little None   1. Turning over in bed (including adjusting bedclothes, sheets and blankets)? [] 1   [] 2   [x] 3   [] 4   2. Sitting down on and standing up from a chair with arms ( e.g., wheelchair, bedside commode, etc.)   [] 1   [] 2   [x] 3   [] 4   3. Moving from lying on back to sitting on the side of the bed? [] 1   [] 2   [x] 3   [] 4   How much help from another person does the patient currently need. .. Total A Lot A Little None   4. Moving to and from a bed to a chair (including a wheelchair)? [] 1   [] 2   [x] 3   [] 4   5. Need to walk in hospital room? [] 1   [] 2   [x] 3   [] 4   6. Climbing 3-5 steps with a railing? [] 1   [] 2   [x] 3   [] 4   © , Trustees of [address] [place] [address], under license to [employer]. All rights reserved      Score:  Initial: 18 Most Recent: X (Date: -- )    Interpretation of Tool:  Represents activities that are increasingly more difficult (i.e. Bed mobility, Transfers, Gait).     Medical Necessity:     · Patient is expected to demonstrate progress in   · strength and functional technique  ·  to   · decrease assistance required with functional mobility and strengthening  · .  Reason for Services/Other Comments:  · Patient continues to require skilled intervention due to   · Inability to complete functional mobility and strengthening independently  · . Use of outcome tool(s) and clinical judgement create a POC that gives a: Clear prediction of patient's progress: LOW COMPLEXITY            TREATMENT:   (In addition to Assessment/Re-Assessment sessions the following treatments were rendered)   Pre-treatment Symptoms/Complaints:  Feeling a little better  Pain: Initial: no reports of pain  Pain Intensity 1: 0  Post Session:  no reports of pain     Therapeutic Activity: (15 Minutes   ):  Therapeutic activities including Bed transfers, Chair transfers, Toilet transfers, Ambulation on level ground and standing dynamic balance to improve mobility, strength and balance. Required minimal   to promote dynamic balance in standing. Therapeutic Exercise: (15 Minutes):  Exercises per grid below to improve strength and coordination. Required minimal verbal cues to promote proper body alignment. Progressed range as indicated. Date:  12/7 Date:   Date:     Activity/Exercise Parameters Parameters Parameters   Ankle pumps 15 B     Hip abd/add 15 B     heelslides 15 B     SAQ 15 B     SLR 15 B                           Braces/Orthotics/Lines/Etc:   · O2 Device: Room air  Treatment/Session Assessment:    · Response to Treatment: good participation with therapy  · Interdisciplinary Collaboration:   o Physical Therapy Assistant  o Registered Nurse  · After treatment position/precautions:   o Supine in bed  o Bed alarm/tab alert on  o Bed/Chair-wheels locked  o Bed in low position  o Call light within reach  o RN notified   · Compliance with Program/Exercises: Will assess as treatment progresses  · Recommendations/Intent for next treatment session: \"Next visit will focus on advancements to more challenging activities and reduction in assistance provided\".   Total Treatment Duration:  PT Patient Time In/Time Out  Time In: 0830  Time Out: 0900    Heaven Ramirez, PTA

## 2020-12-08 VITALS
WEIGHT: 157 LBS | HEART RATE: 90 BPM | OXYGEN SATURATION: 95 % | TEMPERATURE: 98.2 F | RESPIRATION RATE: 18 BRPM | BODY MASS INDEX: 20.81 KG/M2 | HEIGHT: 73 IN | DIASTOLIC BLOOD PRESSURE: 84 MMHG | SYSTOLIC BLOOD PRESSURE: 125 MMHG

## 2020-12-08 PROCEDURE — 74011250637 HC RX REV CODE- 250/637: Performed by: FAMILY MEDICINE

## 2020-12-08 PROCEDURE — 2709999900 HC NON-CHARGEABLE SUPPLY

## 2020-12-08 PROCEDURE — 99218 HC RM OBSERVATION: CPT

## 2020-12-08 RX ADMIN — POLYETHYLENE GLYCOL 3350 17 G: 17 POWDER, FOR SOLUTION ORAL at 08:03

## 2020-12-08 RX ADMIN — Medication 81 MG: at 07:54

## 2020-12-08 RX ADMIN — LORATADINE 10 MG: 10 TABLET ORAL at 07:54

## 2020-12-08 NOTE — DISCHARGE SUMMARY
Date of Admission: 12/2/2020  Date of Discharge:     Discharge Diagnoses:  1. Syncope  2. Adenopathy concerning of lymphoma     Discharge Medications:  Current Discharge Medication List      CONTINUE these medications which have CHANGED    Details   traMADoL (ULTRAM) 50 mg tablet Take 1 Tab by mouth every six (6) hours as needed for Pain for up to 180 days. Max Daily Amount: 200 mg. TAKE 1 TABLET EVERY 6 HOURS  Qty: 12 Tab, Refills: 0    Associated Diagnoses: Neuralgia         CONTINUE these medications which have NOT CHANGED    Details   atorvastatin (LIPITOR) 10 mg tablet take 1 tablet by mouth once daily  Qty: 30 Tab, Refills: 11    Comments: May do 90 day supply if insurance allows      levocetirizine (XYZAL) 5 mg tablet Take 1 Tab by mouth daily. Indications: Allergic Rhinitis  Qty: 30 Tab, Refills: 5    Comments: Label: for ear blockage  Associated Diagnoses: Seromucinous otitis media of both ears      aspirin delayed-release 81 mg tablet Take  by mouth daily. Pending Labs:  None    Follow-up (including scheduled tests): Follow-up Information     Follow up With Specialties Details Why Contact Info    [address] [place] [address] Shantelle Olsen[address] Mohit Ambrosio [facility] [id number]  [address] [facility], [address] [facility], [place] Family Medicine   [address] [place] [phone]      Shahida Lagunas MD General Surgery, Thoracic Surgery, Surgery On 12/15/2020 APPT TIME 9:15 AM PER HIREN. [address] [place]  [address] [facility] [address] [place] Oral Gomes MD Hematology and Oncology, Internal Medicine, Hematology, Oncology On 12/21/2020 APPTTIME 10:30 AM , LAUREANO [address] [facility]  [phone]             History of Present Illness:  80 y.o. male who presented to the ED for cc fall after syncopal event in garage. Event was not witnessed.  He states he was climbing his stairs and bent down to change his shoes. When standing back up he became dizzy and hit head. Hx of HLD, chronic pain. Also admits to 50 pound weight loss in last 6 months. Does not recall family hx of cancer. CT cervical spine showed multiple enlarged lymph nodes within the right neck one  of which measures greater than 3 cm in size. A partially imaged mass within the  left infraclavicular region measures at least 4 cm in size. The findings would be concerning for metastatic disease or lymphoma. Past Medical History:  Past Medical History:   Diagnosis Date    Mixed hyperlipidemia 4/4/2013    Neuralgia 4/4/2013       Allergies:  No Known Allergies    Hospital Course:  80 y. o. male who presented to the ED for cc fall after syncopal event in [facility]. Event was not witnessed. He states he was climbing his stairs and bent down to change his shoes. When standing back up he became dizzy and hit head. Hx of HLD, chronic pain. Also admits to 50 pound weight loss in last 6 months. Does not recall family hx of cancer. Hg 11.4. CT cervical spine showed multiple enlarged lymph nodes within the right neck one of which measures greater than 3 cm in size. A partially imaged mass within the left infraclavicular region measures at least 4 cm in size. The findings would be concerning for metastatic disease or lymphoma. Patient was admitted for syncope along with further work up for possible metastatic disease. . Iron studies normal. TSH normal. US carotids with no significant stenosis. ECHO with no significant structural abnormality. CT neck, chest, abdomen and pelvis with contrast showed extensive right neck adenopathy, right axillary adenopathy with marked abnormal soft tissue mass centered at the left axilla and left subpectoral space. Consultation of findings suggest lymphoma.  There are at least 2 hypoenhancing lesions within the right hepatic lobe which may represent additional foci of disease although remain indeterminate. 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 Will need to establish with vascular surgery as outpatient via PCP. Keep BP controlled with systolic around 165.     Oncology consulted for further investigation who will follow as outpatient. Surgery has been consulted for biopsy that they will schedule as outpatient next week.      Procedures:  None    Discharge Day Information:  Follow with PMD    Activity: Up and loree with assistance   Diet: Cardiac   Wound Care: None     Discharge Physical Exam:  General: Alert, oriented, NAD  HEENT: NC/AT, EOM are intact  Neck: neck mass  Cardiovascular: RRR, S1, S2, no murmurs  Respiratory: Lungs are clear, no wheezes or rales  Abdomen: Soft, NT, ND  Back: No CVA tenderness, no paraspinal tenderness  Extremities: LE without pedal edema, no erythema  Neuro: A&O, CN are intact, no focal deficits  Skin: no rash or ulcers  Psych: good mood and affect    Condition: Improved    Disposition: SNF    Consultants During This Hospitalization: Oncology, surgery

## 2020-12-08 NOTE — PROGRESS NOTES
Care Management Interventions  PCP Verified by CM: Yes  Mode of Transport at Discharge: BLS  Transition of Care Consult (CM Consult): SNF  Partner SNF: Yes  Discharge Durable Medical Equipment: No  Physical Therapy Consult: Yes  Occupational Therapy Consult: Yes  Current Support Network: Own Home, Lives with Spouse  Confirm Follow Up Transport: Family  The Plan for Transition of Care is Related to the Following Treatment Goals : SNF for STR  The Patient and/or Patient Representative was Provided with a Choice of Provider and Agrees with the Discharge Plan?: Yes  Name of the Patient Representative Who was Provided with a Choice of Provider and Agrees with the Discharge Plan: Tristan Billy  Freedom of Choice List was Provided with Basic Dialogue that Supports the Patient's Individualized Plan of Care/Goals, Treatment Preferences and Shares the Quality Data Associated with the Providers?: Yes  Discharge Location  Discharge Placement: Skilled nursing facility      Per MD pt stable for d/c.  ELLE spoke with pt who is aware of transport to [facility]. ELLE spoke with pt's spouse & provided transport time to [place]Â° #. Chart copied, nsg called report.

## 2020-12-08 NOTE — PROGRESS NOTES
Pt discharged to [facility]. Trans[facility]. [facility]

## 2020-12-09 NOTE — ADT AUTH CERT NOTES
PREVIOUSLY DENIED FOR INPATIENT DOWNGRADED TO OBSERVATION REF # [id number] PLEASE FAX FORM  OR CALL BACK TO NOTIFY IF  AUTHORIZATION FOR OBSERVATION IS OR IS NOT REQUIRED  PHONE # [phone]  FAX # [phone]

## 2020-12-11 ENCOUNTER — HOSPITAL ENCOUNTER (OUTPATIENT)
Dept: LAB | Age: 85
Discharge: HOME | End: 2020-12-11

## 2020-12-11 LAB
ANION GAP SERPL CALC-SCNC: 9 MMOL/L (ref 7–16)
BASOPHILS # BLD: 0 K/UL (ref 0–0.2)
BASOPHILS NFR BLD: 1 % (ref 0–2)
BUN SERPL-MCNC: 36 MG/DL (ref 8–23)
CALCIUM SERPL-MCNC: 10.8 MG/DL (ref 8.3–10.4)
CHLORIDE SERPL-SCNC: 105 MMOL/L (ref 98–107)
CO2 SERPL-SCNC: 28 MMOL/L (ref 21–32)
CREAT SERPL-MCNC: 1.53 MG/DL (ref 0.8–1.5)
DIFFERENTIAL METHOD BLD: ABNORMAL
EOSINOPHIL # BLD: 0.2 K/UL (ref 0–0.8)
EOSINOPHIL NFR BLD: 3 % (ref 0.5–7.8)
ERYTHROCYTE [DISTWIDTH] IN BLOOD BY AUTOMATED COUNT: 13.9 % (ref 11.9–14.6)
GLUCOSE SERPL-MCNC: 72 MG/DL (ref 65–100)
HCT VFR BLD AUTO: 36.3 % (ref 41.1–50.3)
HGB BLD-MCNC: 11.3 G/DL (ref 13.6–17.2)
IMM GRANULOCYTES # BLD AUTO: 0 K/UL (ref 0–0.5)
IMM GRANULOCYTES NFR BLD AUTO: 0 % (ref 0–5)
LYMPHOCYTES # BLD: 0.8 K/UL (ref 0.5–4.6)
LYMPHOCYTES NFR BLD: 13 % (ref 13–44)
MCH RBC QN AUTO: 31.2 PG (ref 26.1–32.9)
MCHC RBC AUTO-ENTMCNC: 31.1 G/DL (ref 31.4–35)
MCV RBC AUTO: 100.3 FL (ref 79.6–97.8)
MONOCYTES # BLD: 0.6 K/UL (ref 0.1–1.3)
MONOCYTES NFR BLD: 11 % (ref 4–12)
NEUTS SEG # BLD: 4.4 K/UL (ref 1.7–8.2)
NEUTS SEG NFR BLD: 73 % (ref 43–78)
NRBC # BLD: 0 K/UL (ref 0–0.2)
PLATELET # BLD AUTO: 260 K/UL (ref 150–450)
PMV BLD AUTO: 9.9 FL (ref 9.4–12.3)
POTASSIUM SERPL-SCNC: 4.7 MMOL/L (ref 3.5–5.1)
RBC # BLD AUTO: 3.62 M/UL (ref 4.23–5.6)
SODIUM SERPL-SCNC: 142 MMOL/L (ref 138–145)
WBC # BLD AUTO: 6.1 K/UL (ref 4.3–11.1)

## 2020-12-11 PROCEDURE — 80048 BASIC METABOLIC PNL TOTAL CA: CPT

## 2020-12-11 PROCEDURE — 85025 COMPLETE CBC W/AUTO DIFF WBC: CPT

## 2020-12-18 ENCOUNTER — PATIENT OUTREACH (OUTPATIENT)
Dept: CASE MANAGEMENT | Age: 85
End: 2020-12-18

## 2020-12-18 NOTE — PROGRESS NOTES
Community Care Team documentation for patient in [facility]    The information below provided by:[employer]    PT Update:     Pt was participating in PT and OT therapy since admission and has DC off therapy as of 12/15 to hospice care. Pt made some progress in bed mobility but limited due to medical condition. Pt to remain in LTC at [facility] under hospice care. Nursing Update:      Discharge Date:Care plan schedule for tomorrow, discussed moving to Healthcare [facility]. After care plan meeting decided to stay at [facility] in [facility], family has chosen [facility].       Assign to [facility]: